# Patient Record
Sex: FEMALE | Race: WHITE | Employment: FULL TIME | URBAN - METROPOLITAN AREA
[De-identification: names, ages, dates, MRNs, and addresses within clinical notes are randomized per-mention and may not be internally consistent; named-entity substitution may affect disease eponyms.]

---

## 2017-03-06 ENCOUNTER — APPOINTMENT (OUTPATIENT)
Dept: GENERAL RADIOLOGY | Age: 37
End: 2017-03-06
Attending: INTERNAL MEDICINE
Payer: COMMERCIAL

## 2017-03-06 ENCOUNTER — HOSPITAL ENCOUNTER (EMERGENCY)
Age: 37
Discharge: HOME | End: 2017-03-06
Attending: INTERNAL MEDICINE
Payer: COMMERCIAL

## 2017-03-06 VITALS
OXYGEN SATURATION: 100 % | HEIGHT: 63 IN | BODY MASS INDEX: 45.89 KG/M2 | SYSTOLIC BLOOD PRESSURE: 149 MMHG | RESPIRATION RATE: 16 BRPM | DIASTOLIC BLOOD PRESSURE: 89 MMHG | WEIGHT: 259 LBS | TEMPERATURE: 97.6 F | HEART RATE: 71 BPM

## 2017-03-06 DIAGNOSIS — M54.10 RADICULOPATHY, UNSPECIFIED SPINAL REGION: Primary | ICD-10-CM

## 2017-03-06 LAB
APPEARANCE UR: CLEAR
BILIRUB UR QL: NEGATIVE
COLOR UR: YELLOW
GLUCOSE UR STRIP.AUTO-MCNC: NEGATIVE MG/DL
HCG UR QL: NEGATIVE
HGB UR QL STRIP: NEGATIVE
KETONES UR QL STRIP.AUTO: NEGATIVE MG/DL
LEUKOCYTE ESTERASE UR QL STRIP.AUTO: NEGATIVE
NITRITE UR QL STRIP.AUTO: NEGATIVE
PH UR STRIP: 7 [PH] (ref 5–8)
PROT UR STRIP-MCNC: NEGATIVE MG/DL
SP GR UR REFRACTOMETRY: 1.02 (ref 1–1.03)
UROBILINOGEN UR QL STRIP.AUTO: 1 EU/DL (ref 0.2–1)

## 2017-03-06 PROCEDURE — 81025 URINE PREGNANCY TEST: CPT

## 2017-03-06 PROCEDURE — 74011250636 HC RX REV CODE- 250/636: Performed by: INTERNAL MEDICINE

## 2017-03-06 PROCEDURE — 99283 EMERGENCY DEPT VISIT LOW MDM: CPT

## 2017-03-06 PROCEDURE — 81003 URINALYSIS AUTO W/O SCOPE: CPT | Performed by: INTERNAL MEDICINE

## 2017-03-06 PROCEDURE — 96372 THER/PROPH/DIAG INJ SC/IM: CPT

## 2017-03-06 PROCEDURE — 72110 X-RAY EXAM L-2 SPINE 4/>VWS: CPT

## 2017-03-06 RX ORDER — BACLOFEN 10 MG/1
5 TABLET ORAL
Qty: 14 TAB | Refills: 0 | Status: SHIPPED | OUTPATIENT
Start: 2017-03-06

## 2017-03-06 RX ORDER — KETOROLAC TROMETHAMINE 30 MG/ML
60 INJECTION, SOLUTION INTRAMUSCULAR; INTRAVENOUS
Status: COMPLETED | OUTPATIENT
Start: 2017-03-06 | End: 2017-03-06

## 2017-03-06 RX ORDER — PREDNISONE 20 MG/1
60 TABLET ORAL DAILY
Qty: 15 TAB | Refills: 0 | Status: SHIPPED | OUTPATIENT
Start: 2017-03-06 | End: 2017-03-11

## 2017-03-06 RX ORDER — LORATADINE 10 MG/1
10 TABLET ORAL DAILY
COMMUNITY

## 2017-03-06 RX ORDER — ERGOCALCIFEROL 1.25 MG/1
50000 CAPSULE ORAL
COMMUNITY

## 2017-03-06 RX ORDER — AMLODIPINE BESYLATE 10 MG/1
10 TABLET ORAL DAILY
COMMUNITY

## 2017-03-06 RX ORDER — IBUPROFEN 800 MG/1
800 TABLET ORAL
Qty: 20 TAB | Refills: 0 | Status: SHIPPED | OUTPATIENT
Start: 2017-03-06 | End: 2017-03-13

## 2017-03-06 RX ORDER — ACETAMINOPHEN AND CODEINE PHOSPHATE 300; 30 MG/1; MG/1
1 TABLET ORAL
Qty: 12 TAB | Refills: 0 | Status: SHIPPED | OUTPATIENT
Start: 2017-03-06 | End: 2021-01-27

## 2017-03-06 RX ORDER — HYDROCHLOROTHIAZIDE 25 MG/1
TABLET ORAL DAILY
COMMUNITY
End: 2021-01-27

## 2017-03-06 RX ADMIN — KETOROLAC TROMETHAMINE 60 MG: 30 INJECTION, SOLUTION INTRAMUSCULAR at 11:34

## 2017-03-06 NOTE — DISCHARGE INSTRUCTIONS
Sciatica: Care Instructions  Your Care Instructions    Sciatica (say \"xif-VW-oq-kuh\") is an irritation of one of the sciatic nerves, which come from the spinal cord in the lower back. The sciatic nerves and their branches extend down through the buttock to the foot. Sciatica can develop when an injured disc in the back presses against a spinal nerve root. Its main symptom is pain, numbness, or weakness that is often worse in the leg or foot than in the back. Sciatica often will improve and go away with time. Early treatment usually includes medicines and exercises to relieve pain. Follow-up care is a key part of your treatment and safety. Be sure to make and go to all appointments, and call your doctor if you are having problems. It's also a good idea to know your test results and keep a list of the medicines you take. How can you care for yourself at home? · Take pain medicines exactly as directed. ¨ If the doctor gave you a prescription medicine for pain, take it as prescribed. ¨ If you are not taking a prescription pain medicine, ask your doctor if you can take an over-the-counter medicine. · Use heat or ice to relieve pain. ¨ To apply heat, put a warm water bottle, heating pad set on low, or warm cloth on your back. Do not go to sleep with a heating pad on your skin. ¨ To use ice, put ice or a cold pack on the area for 10 to 20 minutes at a time. Put a thin cloth between the ice and your skin. · Avoid sitting if possible, unless it feels better than standing. · Alternate lying down with short walks. Increase your walking distance as you are able to without making your symptoms worse. · Do not do anything that makes your symptoms worse. When should you call for help? Call 911 anytime you think you may need emergency care. For example, call if:  · You are unable to move a leg at all.   Call your doctor now or seek immediate medical care if:  · You have new or worse symptoms in your legs or buttocks. Symptoms may include:  ¨ Numbness or tingling. ¨ Weakness. ¨ Pain. · You lose bladder or bowel control. Watch closely for changes in your health, and be sure to contact your doctor if:  · You are not getting better as expected. Where can you learn more? Go to http://adryan-danielle.info/. Enter [phone] in the search box to learn more about \"Sciatica: Care Instructions. \"  Current as of: May 23, 2016  Content Version: 11.1  © 6476-0627 [employer]. Care instructions adapted under license by [employer] (which disclaims liability or warranty for this information). If you have questions about a medical condition or this instruction, always ask your healthcare professional. [address] any warranty or liability for your use of this information. Learning About Relief for Back Pain  What is back tension and strain? Back strain happens when you overstretch, or pull, a muscle in your back. You may hurt your back in an accident or when you exercise or lift something. Most back pain will get better with rest and time. You can take care of yourself at home to help your back heal.  What can you do first to relieve back pain? When you first feel back pain, try these steps:  · Walk. Take a short walk (10 to 20 minutes) on a level surface (no slopes, hills, or stairs) every 2 to 3 hours. Walk only distances you can manage without pain, especially leg pain. · Relax. Find a comfortable position for rest. Some people are comfortable on the floor or a medium-firm bed with a small pillow under their head and another under their knees. Some people prefer to lie on their side with a pillow between their knees. Don't stay in one position for too long. · Try heat or ice. Try using a heating pad on a low or medium setting, or take a warm shower, for 15 to 20 minutes every 2 to 3 hours. Or you can buy single-use heat wraps that last up to 8 hours.  You can also try an ice pack for 10 to 15 minutes every 2 to 3 hours. You can use an ice pack or a bag of frozen vegetables wrapped in a thin towel. There is not strong evidence that either heat or ice will help, but you can try them to see if they help. You may also want to try switching between heat and cold. · Take pain medicine exactly as directed. ¨ If the doctor gave you a prescription medicine for pain, take it as prescribed. ¨ If you are not taking a prescription pain medicine, ask your doctor if you can take an over-the-counter medicine. What else can you do? · Stretch and exercise. Exercises that increase flexibility may relieve your pain and make it easier for your muscles to keep your spine in a good, neutral position. And don't forget to keep walking. · Do self-massage. You can use self-massage to unwind after work or school or to energize yourself in the morning. You can easily massage your feet, hands, or neck. Self-massage works best if you are in comfortable clothes and are sitting or lying in a comfortable position. Use oil or lotion to massage bare skin. · Reduce stress. Back pain can lead to a vicious [place]: Distress about the pain tenses the muscles in your back, which in turn causes more pain. Learn how to relax your mind and your muscles to lower your stress. Where can you learn more? Go to http://adryan-danielle.info/. Enter Z927 in the search box to learn more about \"Learning About Relief for Back Pain. \"  Current as of: May 23, 2016  Content Version: 11.1  © 9228-9056 [employer]. Care instructions adapted under license by [employer] (which disclaims liability or warranty for this information). If you have questions about a medical condition or this instruction, always ask your healthcare professional. [address] any warranty or liability for your use of this information.

## 2017-03-06 NOTE — Clinical Note
No lifting, pulling, pushing for 3 days and then check with your PCP. Avoid sweets to prevent elevating your blood sugar.

## 2017-03-06 NOTE — ED PROVIDER NOTES
[address]  EMERGENCY DEPARTMENT HISTORY AND PHYSICAL EXAM       Date: 3/6/2017   Patient Name: Claude Floras   YOB: 1980  Medical Record Number: [id number]    History of Presenting Illness     Chief Complaint   Patient presents with    Back Pain        History Provided By:  patient    Additional History:   9:39 AM   Claude Floras is a 43 y.o. female presenting to the ED C/O gradually worsening left lower back pain that radiates down to her BLE (calf), rated 8/10, onset yesterday. Pt states the pain started when she was painting her house. Pt reports she had another similar episode ~2 week ago, which resolved on its own. Pt took 800 mg of Ibuprofen and heating pack with mild relief. PMHX HTN (has not taken her medication today). LMP 5 months ago (takes Amlodipine). Pt denies fever, chill, CP, SOB, dysuria, constipation, incontinence, rash, and any other symptoms or complaints. Primary Care Provider: Ld Aguillon MD   Specialist:    Past History     Past Medical History:   Past Medical History:   Diagnosis Date    Asthma     H/O seasonal allergies     Hypertension         Past Surgical History:   Past Surgical History:   Procedure Laterality Date    HX CHOLECYSTECTOMY      HX RHINOPLASTY      sinus     HX SEPTOPLASTY          Family History:   History reviewed. No pertinent family history. Social History:   Social History   Substance Use Topics    Smoking status: Current Every Day Smoker     Packs/day: 0.25    Smokeless tobacco: None    Alcohol use No        Allergies:   No Known Allergies     Review of Systems   Review of Systems   Constitutional: Negative for chills and fever. Respiratory: Negative for shortness of breath. Cardiovascular: Negative for chest pain. Gastrointestinal: Negative for diarrhea, nausea and vomiting. Musculoskeletal: Positive for back pain. Skin: Negative for rash.    All other systems reviewed and are negative. Physical Exam  Vitals:    03/06/17 0919   BP: (!) 162/100   Pulse: 67   Resp: 18   Temp: 97.6 °F (36.4 °C)   SpO2: 100%   Weight: 117.5 kg (259 lb)   Height: 5' 3\" (1.6 m)       Physical Exam   Constitutional: She is oriented to person, place, and time. She appears well-developed and well-nourished. HENT:   Head: Normocephalic and atraumatic. Right Ear: External ear normal.   Left Ear: External ear normal.   Nose: Nose normal.   Mouth/Throat: Oropharynx is clear and moist.   Eyes: Conjunctivae and EOM are normal. Pupils are equal, round, and reactive to light. Right eye exhibits no discharge. Left eye exhibits no discharge. No scleral icterus. Neck: Normal range of motion. Neck supple. No JVD present. No tracheal deviation present. Cardiovascular: Normal rate, regular rhythm, normal heart sounds and intact distal pulses. Pulmonary/Chest: Effort normal and breath sounds normal.   Abdominal: Soft. Bowel sounds are normal. She exhibits no distension. There is no tenderness. There is no CVA tenderness. No HSM   Musculoskeletal: Normal range of motion. Lumbar back: She exhibits tenderness (Left SI joint paraspinal L4-S1 tenderness. ). No spasm. No point tenderness. No skin changes. Neurological: She is alert and oriented to person, place, and time. She has normal reflexes. No focal motor weakness. Skin: Skin is warm and dry. No rash noted. Psychiatric: She has a normal mood and affect. Her behavior is normal.   Nursing note and vitals reviewed.       Diagnostic Study Results     Labs -      Recent Results (from the past 12 hour(s))   URINALYSIS W/ RFLX MICROSCOPIC    Collection Time: 03/06/17 10:53 AM   Result Value Ref Range    Color YELLOW      Appearance CLEAR      Specific gravity 1.019 1.005 - 1.030      pH (UA) 7.0 5.0 - 8.0      Protein NEGATIVE  NEG mg/dL    Glucose NEGATIVE  NEG mg/dL    Ketone NEGATIVE  NEG mg/dL    Bilirubin NEGATIVE  NEG      Blood NEGATIVE  NEG Urobilinogen 1.0 0.2 - 1.0 EU/dL    Nitrites NEGATIVE  NEG      Leukocyte Esterase NEGATIVE  NEG     HCG URINE, QL. - POC    Collection Time: 03/06/17 11:01 AM   Result Value Ref Range    Pregnancy test,urine (POC) NEGATIVE  NEG         Radiologic Studies -    11:39 AM  RADIOLOGY FINDINGS  Lumbar spine X-ray shows NAP. May be early DJD in lower lumbar vertebrae. Pending review by Radiologist  Recorded by Asda Schmidt, ED Scribe, as dictated by Jacklyn De La Torre MD     XR SPINE LUMB MIN 4 V    (Results Pending)        Medical Decision Making   I am the first provider for this patient. I reviewed the vital signs, available nursing notes, past medical history, past surgical history, family history and social history. Vital Signs-Reviewed the patient's vital signs. Patient Vitals for the past 12 hrs:   Temp Pulse Resp BP SpO2   03/06/17 0919 97.6 °F (36.4 °C) 67 18 (!) 162/100 100 %       Pulse Oximetry Analysis - Normal 100% on RA. No intervention needed. Provider Notes:   INITIAL CLINICAL IMPRESSION and PLANS:  The patient presents with the primary complaint(s) of: back pain. The presentation, to include historical aspects and clinical findings are consistent with the DX of sciatica. However, other possible DX's to consider as primary, associated with, or exacerbated by include: degenerative disc disease, herniated disc, mass, abscess, spinal stenosis, lumbar radiculopathy    Considering the above, my initial management plan to evaluate and therapeutic interventions include the following and as noted in the orders:    1. Labs: HCG Urine, UA, POC Pregnancy  2. Imaging: XR Spine Lumb      ED Course:     9:39 AM Initial assessment performed. 11:41 AM Discussed results with pt. On exam, pt has no focal neuro deficits. Normal reflexes plantar and knee. Negative straight leg raise. Instructed her not to lift, pull, push anything over 20 pounds for 3 days and to get further instruction from her PCP.  Pt is thankful for her care. Medications Given in the ED:  Medications   ketorolac tromethamine (TORADOL) 60 mg/2 mL injection 60 mg (60 mg IntraMUSCular Given 3/6/17 1134)          Discharge Note:  11:41 AM  Patients results have been reviewed with them. Patient and/or family have verbally conveyed their understanding and agreement of the patient's signs, symptoms, diagnosis, treatment and prognosis and additionally agree to follow up as recommended or return to the Emergency Room should their condition change prior to their follow-up appointment. Patient verbally agrees with the care-plan and verbally conveys that all of their questions have been answered. Discharge instructions have also been provided to the patient with some educational information regarding their diagnosis as well a list of reasons why they would want to return to the ER prior to their follow-up appointment should their condition change. Diagnosis   Clinical Impression:   1. Radiculopathy, unspecified spinal region        Follow-up Information     Follow up With Details Comments Contact Info    Yoel Leonard MD Schedule an appointment as soon as possible for a visit in 2 days  [address] [id number] [phone]      [facility] EMERGENCY DEPT  As needed, If symptoms worsen [address] Dariel Gamez  [phone]          Current Discharge Medication List      START taking these medications    Details   ibuprofen (MOTRIN) 800 mg tablet Take 1 Tab by mouth every six (6) hours as needed for Pain for up to 7 days. Qty: 20 Tab, Refills: 0      baclofen (LIORESAL) 10 mg tablet Take 0.5 Tabs by mouth two (2) times daily as needed. Qty: 14 Tab, Refills: 0      acetaminophen-codeine (TYLENOL-CODEINE #3) 300-30 mg per tablet Take 1 Tab by mouth every six (6) hours as needed for Pain. Max Daily Amount: 4 Tabs.   Qty: 12 Tab, Refills: 0         CONTINUE these medications which have NOT CHANGED Details   AMLODIPINE BESYLATE (AMLODIPINE PO) Take  by mouth daily. METOPROLOL SUCCINATE PO Take  by mouth two (2) times a day. hydroCHLOROthiazide (HYDRODIURIL) 25 mg tablet Take  by mouth daily. LOSARTAN PO Take  by mouth daily. MONTELUKAST SODIUM (SINGULAIR PO) Take  by mouth daily. loratadine (CLARITIN) 10 mg tablet Take 10 mg by mouth daily. ergocalciferol (VITAMIN D2) 50,000 unit capsule Take 50,000 Units by mouth every seven (7) days. _______________________________   Attestations:     SCRIBE ATTESTATION:  This note is prepared by Fuentes Seay, acting as Scribe for Mely Mendoza MD.    PROVIDER ATTESTATION:  Mely Mendoza MD: The scribe's documentation has been prepared under my direction and personally reviewed by me in its entirety.  I confirm that the note above accurately reflects all work, treatment, procedures, and medical decision making performed by me.   _______________________________

## 2017-03-06 NOTE — LETTER
[facility] [place] 
[facility] 
[address] [id number] 
[phone] Work/School Note Date: 3/6/2017 To Whom It May concern: 
 
Annmarie Little was seen and treated today in the emergency room by the following provider(s): 
No providers found. Annmarie Little may return to work on 3/7/17. Sincerely, Dhara Hensley MD

## 2017-03-06 NOTE — LETTER
NOTIFICATION RETURN TO WORK / SCHOOL 
 
3/6/2017 4:21 PM 
 
[facility] [address] [place] [address] To Whom It May Concern: 
 
Lydia Perea is currently under the care of [facility] EMERGENCY DEPT. She will return to work/school on: 3/9/2017 If there are questions or concerns please have the patient contact our office. Sincerely, Philippe Toledo RN

## 2017-03-06 NOTE — ED TRIAGE NOTES
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

## 2017-03-06 NOTE — LETTER
[facility] [place] 
[facility] 
[address] [address] 
[phone] Work/School Note Date: 3/6/2017 To Whom It May concern: 
 
Brandy Felix was seen and treated today in the emergency room by the following provider(s): 
Attending Provider: Velia Renteria MD. Brandy Felix may return to work on 3/6/17. Please allow her to avoid lifting, pulling, pushing for 3 days. Further instruction to be given by PCP. Sincerely, Velia Renteria MD

## 2017-03-06 NOTE — ED NOTES
I have reviewed discharge instructions with the patient. The patient verbalized understanding.   Patient armband removed and shredded, scripts x 3 sent to pharmacy and verified with pt, script x 1 given

## 2018-03-24 ENCOUNTER — APPOINTMENT (OUTPATIENT)
Dept: GENERAL RADIOLOGY | Age: 38
End: 2018-03-24
Attending: PHYSICIAN ASSISTANT
Payer: COMMERCIAL

## 2018-03-24 ENCOUNTER — HOSPITAL ENCOUNTER (EMERGENCY)
Age: 38
Discharge: HOME | End: 2018-03-24
Attending: INTERNAL MEDICINE
Payer: COMMERCIAL

## 2018-03-24 VITALS
HEIGHT: 63 IN | WEIGHT: 259 LBS | RESPIRATION RATE: 28 BRPM | TEMPERATURE: 98 F | DIASTOLIC BLOOD PRESSURE: 72 MMHG | BODY MASS INDEX: 45.89 KG/M2 | OXYGEN SATURATION: 99 % | SYSTOLIC BLOOD PRESSURE: 106 MMHG | HEART RATE: 81 BPM

## 2018-03-24 DIAGNOSIS — R07.9 CHEST PAIN, UNSPECIFIED TYPE: Primary | ICD-10-CM

## 2018-03-24 LAB
ALBUMIN SERPL-MCNC: 3.8 G/DL (ref 3.4–5)
ALBUMIN/GLOB SERPL: 0.9 {RATIO} (ref 0.8–1.7)
ALP SERPL-CCNC: 122 U/L (ref 45–117)
ALT SERPL-CCNC: 64 U/L (ref 13–56)
ANION GAP SERPL CALC-SCNC: 11 MMOL/L (ref 3–18)
APPEARANCE UR: CLEAR
AST SERPL-CCNC: 24 U/L (ref 15–37)
BASOPHILS # BLD: 0 K/UL (ref 0–0.06)
BASOPHILS NFR BLD: 0 % (ref 0–2)
BILIRUB SERPL-MCNC: 0.4 MG/DL (ref 0.2–1)
BILIRUB UR QL: NEGATIVE
BNP SERPL-MCNC: 18 PG/ML (ref 0–450)
BUN SERPL-MCNC: 25 MG/DL (ref 7–18)
BUN/CREAT SERPL: 22 (ref 12–20)
CALCIUM SERPL-MCNC: 9 MG/DL (ref 8.5–10.1)
CHLORIDE SERPL-SCNC: 101 MMOL/L (ref 100–108)
CK MB CFR SERPL CALC: NORMAL % (ref 0–4)
CK MB SERPL-MCNC: <1 NG/ML (ref 5–25)
CK SERPL-CCNC: 70 U/L (ref 26–192)
CK SERPL-CCNC: 73 U/L (ref 26–192)
CK SERPL-CCNC: 88 U/L (ref 26–192)
CO2 SERPL-SCNC: 31 MMOL/L (ref 21–32)
COLOR UR: YELLOW
CREAT SERPL-MCNC: 1.15 MG/DL (ref 0.6–1.3)
D DIMER PPP FEU-MCNC: <0.27 UG/ML(FEU)
DIFFERENTIAL METHOD BLD: ABNORMAL
EOSINOPHIL # BLD: 0.1 K/UL (ref 0–0.4)
EOSINOPHIL NFR BLD: 1 % (ref 0–5)
ERYTHROCYTE [DISTWIDTH] IN BLOOD BY AUTOMATED COUNT: 13.6 % (ref 11.6–14.5)
GLOBULIN SER CALC-MCNC: 4.2 G/DL (ref 2–4)
GLUCOSE SERPL-MCNC: 152 MG/DL (ref 74–99)
GLUCOSE UR STRIP.AUTO-MCNC: NEGATIVE MG/DL
HCG UR QL: NEGATIVE
HCT VFR BLD AUTO: 42.7 % (ref 35–45)
HGB BLD-MCNC: 14.6 G/DL (ref 12–16)
HGB UR QL STRIP: NEGATIVE
KETONES UR QL STRIP.AUTO: NEGATIVE MG/DL
LEUKOCYTE ESTERASE UR QL STRIP.AUTO: NEGATIVE
LIPASE SERPL-CCNC: 201 U/L (ref 73–393)
LYMPHOCYTES # BLD: 1.4 K/UL (ref 0.9–3.6)
LYMPHOCYTES NFR BLD: 10 % (ref 21–52)
MCH RBC QN AUTO: 27.1 PG (ref 24–34)
MCHC RBC AUTO-ENTMCNC: 34.2 G/DL (ref 31–37)
MCV RBC AUTO: 79.4 FL (ref 74–97)
MONOCYTES # BLD: 0.6 K/UL (ref 0.05–1.2)
MONOCYTES NFR BLD: 4 % (ref 3–10)
NEUTS SEG # BLD: 12 K/UL (ref 1.8–8)
NEUTS SEG NFR BLD: 85 % (ref 40–73)
NITRITE UR QL STRIP.AUTO: NEGATIVE
PH UR STRIP: 5.5 [PH] (ref 5–8)
PLATELET # BLD AUTO: 301 K/UL (ref 135–420)
PMV BLD AUTO: 9.2 FL (ref 9.2–11.8)
POTASSIUM SERPL-SCNC: 3.2 MMOL/L (ref 3.5–5.5)
PROT SERPL-MCNC: 8 G/DL (ref 6.4–8.2)
PROT UR STRIP-MCNC: NEGATIVE MG/DL
RBC # BLD AUTO: 5.38 M/UL (ref 4.2–5.3)
SODIUM SERPL-SCNC: 143 MMOL/L (ref 136–145)
SP GR UR REFRACTOMETRY: 1.02 (ref 1–1.03)
TROPONIN I SERPL-MCNC: <0.02 NG/ML (ref 0–0.06)
UROBILINOGEN UR QL STRIP.AUTO: 0.2 EU/DL (ref 0.2–1)
WBC # BLD AUTO: 14.1 K/UL (ref 4.6–13.2)

## 2018-03-24 PROCEDURE — 96374 THER/PROPH/DIAG INJ IV PUSH: CPT

## 2018-03-24 PROCEDURE — 71045 X-RAY EXAM CHEST 1 VIEW: CPT

## 2018-03-24 PROCEDURE — 74011250636 HC RX REV CODE- 250/636: Performed by: PHYSICIAN ASSISTANT

## 2018-03-24 PROCEDURE — 99285 EMERGENCY DEPT VISIT HI MDM: CPT

## 2018-03-24 PROCEDURE — 77030013140 HC MSK NEB VYRM -A

## 2018-03-24 PROCEDURE — 81003 URINALYSIS AUTO W/O SCOPE: CPT | Performed by: PHYSICIAN ASSISTANT

## 2018-03-24 PROCEDURE — 74011000250 HC RX REV CODE- 250: Performed by: PHYSICIAN ASSISTANT

## 2018-03-24 PROCEDURE — 96375 TX/PRO/DX INJ NEW DRUG ADDON: CPT

## 2018-03-24 PROCEDURE — 83880 ASSAY OF NATRIURETIC PEPTIDE: CPT | Performed by: PHYSICIAN ASSISTANT

## 2018-03-24 PROCEDURE — 85025 COMPLETE CBC W/AUTO DIFF WBC: CPT | Performed by: PHYSICIAN ASSISTANT

## 2018-03-24 PROCEDURE — 81025 URINE PREGNANCY TEST: CPT | Performed by: PHYSICIAN ASSISTANT

## 2018-03-24 PROCEDURE — 83690 ASSAY OF LIPASE: CPT | Performed by: PHYSICIAN ASSISTANT

## 2018-03-24 PROCEDURE — 82550 ASSAY OF CK (CPK): CPT | Performed by: INTERNAL MEDICINE

## 2018-03-24 PROCEDURE — 93005 ELECTROCARDIOGRAM TRACING: CPT

## 2018-03-24 PROCEDURE — 94640 AIRWAY INHALATION TREATMENT: CPT

## 2018-03-24 PROCEDURE — 96361 HYDRATE IV INFUSION ADD-ON: CPT

## 2018-03-24 PROCEDURE — 80053 COMPREHEN METABOLIC PANEL: CPT | Performed by: PHYSICIAN ASSISTANT

## 2018-03-24 PROCEDURE — 85379 FIBRIN DEGRADATION QUANT: CPT | Performed by: PHYSICIAN ASSISTANT

## 2018-03-24 RX ORDER — FAMOTIDINE 10 MG/ML
20 INJECTION INTRAVENOUS
Status: COMPLETED | OUTPATIENT
Start: 2018-03-24 | End: 2018-03-24

## 2018-03-24 RX ORDER — IPRATROPIUM BROMIDE AND ALBUTEROL SULFATE 2.5; .5 MG/3ML; MG/3ML
3 SOLUTION RESPIRATORY (INHALATION)
Status: COMPLETED | OUTPATIENT
Start: 2018-03-24 | End: 2018-03-24

## 2018-03-24 RX ORDER — KETOROLAC TROMETHAMINE 30 MG/ML
30 INJECTION, SOLUTION INTRAMUSCULAR; INTRAVENOUS
Status: COMPLETED | OUTPATIENT
Start: 2018-03-24 | End: 2018-03-24

## 2018-03-24 RX ADMIN — IPRATROPIUM BROMIDE AND ALBUTEROL SULFATE 3 ML: .5; 3 SOLUTION RESPIRATORY (INHALATION) at 17:55

## 2018-03-24 RX ADMIN — SODIUM CHLORIDE 1000 ML: 900 INJECTION, SOLUTION INTRAVENOUS at 21:00

## 2018-03-24 RX ADMIN — KETOROLAC TROMETHAMINE 30 MG: 30 INJECTION, SOLUTION INTRAMUSCULAR at 17:55

## 2018-03-24 RX ADMIN — FAMOTIDINE 20 MG: 10 INJECTION INTRAVENOUS at 18:10

## 2018-03-24 NOTE — ED TRIAGE NOTES
Triage: chest pain x 2 hours, left arm pain. Sepsis Screening completed    (  )Patient meets SIRS criteria. ( x )Patient does not meet SIRS criteria.       SIRS Criteria is achieved when two or more of the following are present   Temperature < 96.8°F (36°C) or > 100.9°F (38.3°C)   Heart Rate > 90 beats per minute   Respiratory Rate > 20 breaths per minute   WBC count > 12,000 or <4,000 or > 10% bands

## 2018-03-24 NOTE — ED PROVIDER NOTES
EMERGENCY DEPARTMENT HISTORY AND PHYSICAL EXAM    Date: 3/24/2018  Patient Name: Sheila Paredes    History of Presenting Illness     Chief Complaint   Patient presents with    Chest Pain         History Provided By: Patient    Chief Complaint: CP  Duration: 2 Hours  Timing:  Acute  Location: Diffuse over chest  Associated Symptoms: SOB    Additional History (Context):   5:30 PM  Sheila Paredes is a 42 y.o. female with PMHX of HTN urgency, asthma who presents to the emergency department C/O CP that radiates into her back, onset 2 hours ago while driving. Associated sxs include SOB. Pt takes Amlodipine, Hydrodiuril, Lioresal daily. Pt uses Mirena as birth control. Pt reports that she underwent a cholecystectomy many years ago. Pt socially endorses tobacco (.5 pack per week). Pt denies HX of MI, acid reflux, recent surgeries, and any other sxs or complaints. PCP: Bianca Gordon MD    Current Outpatient Prescriptions   Medication Sig Dispense Refill    AMLODIPINE BESYLATE (AMLODIPINE PO) Take  by mouth daily.  METOPROLOL SUCCINATE PO Take  by mouth two (2) times a day.  hydroCHLOROthiazide (HYDRODIURIL) 25 mg tablet Take  by mouth daily.  LOSARTAN PO Take  by mouth daily.  loratadine (CLARITIN) 10 mg tablet Take 10 mg by mouth daily.  ergocalciferol (VITAMIN D2) 50,000 unit capsule Take 50,000 Units by mouth every seven (7) days.  MONTELUKAST SODIUM (SINGULAIR PO) Take  by mouth daily.  baclofen (LIORESAL) 10 mg tablet Take 0.5 Tabs by mouth two (2) times daily as needed. 14 Tab 0    acetaminophen-codeine (TYLENOL-CODEINE #3) 300-30 mg per tablet Take 1 Tab by mouth every six (6) hours as needed for Pain. Max Daily Amount: 4 Tabs.  12 Tab 0       Past History     Past Medical History:  Past Medical History:   Diagnosis Date    Asthma     H/O seasonal allergies     Hypertension        Past Surgical History:  Past Surgical History:   Procedure Laterality Date    HX CHOLECYSTECTOMY      HX RHINOPLASTY      sinus     HX SEPTOPLASTY         Family History:  History reviewed. No pertinent family history. Social History:  Social History   Substance Use Topics    Smoking status: Current Every Day Smoker     Packs/day: 0.25    Smokeless tobacco: None    Alcohol use No       Allergies:  No Known Allergies      Review of Systems   Review of Systems   Constitutional: Negative for chills and fever. Respiratory: Positive for shortness of breath. Negative for cough. Cardiovascular: Positive for chest pain. Negative for palpitations and leg swelling. Gastrointestinal: Negative for abdominal pain, nausea and vomiting. Genitourinary: Negative for dysuria. Musculoskeletal: Negative for back pain. Skin: Negative for rash. Neurological: Negative for dizziness, weakness, light-headedness and headaches. Hematological: Negative for adenopathy. All other systems reviewed and are negative. Physical Exam     Vitals:    03/24/18 2000 03/24/18 2030 03/24/18 2130 03/24/18 2230   BP: 93/55 108/76 96/60 106/72   Pulse: 78 79 80 81   Resp: 19 24 25 28   Temp:       SpO2: 97% 95% 97% 99%   Weight:       Height:         Physical Exam   Constitutional: She is oriented to person, place, and time. She appears well-developed and well-nourished. No distress.  obese female in NAD. Alert. Appears uncomfortable at times. HENT:   Head: Normocephalic and atraumatic. Left Ear: External ear normal.   Nose: Nose normal.   Eyes: Conjunctivae are normal. No scleral icterus. Neck: Normal range of motion. Cardiovascular: Normal rate, regular rhythm, normal heart sounds and intact distal pulses. Exam reveals no gallop and no friction rub. No murmur heard. Pulmonary/Chest: Effort normal and breath sounds normal. No accessory muscle usage. No tachypnea. No respiratory distress. She has no decreased breath sounds. She has no wheezes. She has no rhonchi. She has no rales. She exhibits tenderness. Abdominal: Soft. Normal appearance and bowel sounds are normal. She exhibits no ascites and no mass. There is no rigidity, no guarding, no CVA tenderness and no tenderness at McBurney's point. Musculoskeletal: Normal range of motion. She exhibits no edema. Neurological: She is alert and oriented to person, place, and time. Skin: Skin is warm and dry. No rash noted. She is not diaphoretic. No erythema. Psychiatric: She has a normal mood and affect. Judgment normal.   Nursing note and vitals reviewed. Diagnostic Study Results     Labs -     No results found for this or any previous visit (from the past 12 hour(s)). Radiologic Studies -   XR CHEST PORT   Final Result        CT Results  (Last 48 hours)    None        CXR Results  (Last 48 hours)               03/24/18 1808  XR CHEST PORT Final result    Impression:  IMPRESSION:       No acute cardiopulmonary process. Narrative:  EXAM: Portable chest x-ray       INDICATION: Chest pain. COMPARISON: No prior relevant study available for comparison. TECHNIQUE: Single AP view of the chest was obtained.       _______________       FINDINGS:       The lungs are clear of focal infiltrate. There is no pneumothorax or pleural   effusion. Heart size is within normal limits. There is no significant vascular   congestion. There is no acute osseous abnormality.         _______________               7:06 PM  RADIOLOGY FINDINGS  Chest X-ray shows NAP  Pending review by Radiologist  Recorded by Humphrey Lilly ED Scribe, as dictated by Wesly Truong PA-C    Medications given in the ED-  Medications   albuterol-ipratropium (DUO-NEB) 2.5 MG-0.5 MG/3 ML (3 mL Nebulization Given 3/24/18 1755)   ketorolac (TORADOL) injection 30 mg (30 mg IntraVENous Given 3/24/18 1755)   famotidine (PF) (PEPCID) injection 20 mg (20 mg IntraVENous Given 3/24/18 1810)   sodium chloride 0.9 % bolus infusion 1,000 mL (0 mL IntraVENous IV Completed 3/24/18 2200)         Medical Decision Making   I am the first provider for this patient. I reviewed the vital signs, available nursing notes, past medical history, past surgical history, family history and social history. Vital Signs-Reviewed the patient's vital signs. Pulse Oximetry Analysis - 100% on RA     Cardiac Monitor:  Rate: 71 bpm  Rhythm: NSR    EKG interpretation: (Preliminary)  4:25 PM   NSR. 71 bpm. No STEMI. EKG read by Sampson Rubio MD at 4:25 PM     EKG interpretation: (Secondary)  7:42 PM   78 bpm. NSR. Early repolarization. EKG read by Toro Marques PA-C at 7:42 PM    EKG interpretation: (Tertiary)  82 bpm. NSR. Unremarkable T wave. EKG read by Christelle. Alexandre Walker MD at 9:43 PM      Records Reviewed: Nursing Notes    Provider Notes (Medical Decision Making): ACS/MI, PE, arrhythmia, pericarditis, myocarditis, PNA, COPD, CHF, PTX, GERD, chest wall pain. Doubt dissection. Procedures:  Procedures    ED Course:   5:30 PM   Initial assessment performed. The patients presenting problems have been discussed, and they are in agreement with the care plan formulated and outlined with them. I have encouraged them to ask questions as they arise throughout their visit. 7:16 PM  Feels much better, but still has some sxs including chest discomfort, so will recheck enzymes and EKG and likely discharge home. CONSULT NOTE:   7:50 PM  Toro Marques PA-C spoke with Christelle. Alexandre Walker MD   Specialty: ED Medicine  Discussed pt's hx, disposition, and available diagnostic and imaging results  in person. Reviewed care plans. Consulting physician agrees with plans as outlined. Reviewed EKGs and case with Dr. Alexandre Walker. Feels that EKG is likely early repolarization. Recommends rechecking 3 hr Troponin around 2100 and obtaining a third EKG. Pt is CP free, playing on her phone, appears very comfortable, and understands plan. Low cardiac risk.      SIGN OUT:  9:00 PM  Patient's presentation, labs/imaging and plan of care was reviewed with Nadeem Broderick PA-C as part of sign out. They will review serial EKG and Troponin and likely discharge as part of the plan discussed with the patient. Nadeem Broderick PA-C's assistance in completion of this plan is greatly appreciated but it should be noted that I will be the provider of record for this patient. 10:22 PM  Cardiac enzymes negative, EKG normal. Discussed case with Dr Rossy Veliz who agrees with d/c home with cardiology f/u      Diagnosis and Disposition       DISCHARGE NOTE:  10:17 PM  Lane Fernandez  results have been reviewed with her. She has been counseled regarding her diagnosis, treatment, and plan. She verbally conveys understanding and agreement of the signs, symptoms, diagnosis, treatment and prognosis and additionally agrees to follow up as discussed. She also agrees with the care-plan and conveys that all of her questions have been answered. I have also provided discharge instructions for her that include: educational information regarding their diagnosis and treatment, and list of reasons why they would want to return to the ED prior to their follow-up appointment, should her condition change. She has been provided with education for proper emergency department utilization. CLINICAL IMPRESSION:    1. Chest pain, unspecified type        PLAN:  1. D/C Home  2. Discharge Medication List as of 3/24/2018 10:16 PM        3. Follow-up Information     Follow up With Details Comments Contact Info    Jahaira Mayorga MD Schedule an appointment as soon as possible for a visit For primary care follow up [address] [place] [address] EMERGENCY DEPT Go to As needed, as symptoms worsen [address] [place] [address]  [phone]        _______________________________    Attestations: This note is prepared by Salome Padilla, acting as Scribe for Michael Hurst PA-C.     Lala Turner TEMITOPE Herron:  The scribe's documentation has been prepared under my direction and personally reviewed by me in its entirety. I confirm that the note above accurately reflects all work, treatment, procedures, and medical decision making performed by me. This note is prepared by Nikolas Rodriguez, acting as Scribe for Nadeem Broderick PA-C. Nadeem Broderick PA-C: The scribe's documentation has been prepared under my direction and personally reviewed by me in its entirety.  I confirm that the note above accurately reflects all work, treatment, procedures, and medical decision making performed by me.    _______________________________

## 2018-03-25 LAB
ATRIAL RATE: 71 BPM
ATRIAL RATE: 78 BPM
ATRIAL RATE: 82 BPM
CALCULATED P AXIS, ECG09: 26 DEGREES
CALCULATED P AXIS, ECG09: 27 DEGREES
CALCULATED P AXIS, ECG09: 31 DEGREES
CALCULATED R AXIS, ECG10: 28 DEGREES
CALCULATED R AXIS, ECG10: 32 DEGREES
CALCULATED R AXIS, ECG10: 37 DEGREES
CALCULATED T AXIS, ECG11: 22 DEGREES
CALCULATED T AXIS, ECG11: 23 DEGREES
CALCULATED T AXIS, ECG11: 29 DEGREES
DIAGNOSIS, 93000: NORMAL
P-R INTERVAL, ECG05: 174 MS
P-R INTERVAL, ECG05: 174 MS
P-R INTERVAL, ECG05: 178 MS
Q-T INTERVAL, ECG07: 380 MS
Q-T INTERVAL, ECG07: 380 MS
Q-T INTERVAL, ECG07: 388 MS
QRS DURATION, ECG06: 88 MS
QRS DURATION, ECG06: 88 MS
QRS DURATION, ECG06: 98 MS
QTC CALCULATION (BEZET), ECG08: 421 MS
QTC CALCULATION (BEZET), ECG08: 433 MS
QTC CALCULATION (BEZET), ECG08: 443 MS
VENTRICULAR RATE, ECG03: 71 BPM
VENTRICULAR RATE, ECG03: 78 BPM
VENTRICULAR RATE, ECG03: 82 BPM

## 2018-03-25 NOTE — DISCHARGE INSTRUCTIONS
Chest Pain: Care Instructions  Your Care Instructions    There are many things that can cause chest pain. Some are not serious and will get better on their own in a few days. But some kinds of chest pain need more testing and treatment. Your doctor may have recommended a follow-up visit in the next 8 to 12 hours. If you are not getting better, you may need more tests or treatment. Even though your doctor has released you, you still need to watch for any problems. The doctor carefully checked you, but sometimes problems can develop later. If you have new symptoms or if your symptoms do not get better, get medical care right away. If you have worse or different chest pain or pressure that lasts more than 5 minutes or you passed out (lost consciousness), call 911 or seek other emergency help right away. A medical visit is only one step in your treatment. Even if you feel better, you still need to do what your doctor recommends, such as going to all suggested follow-up appointments and taking medicines exactly as directed. This will help you recover and help prevent future problems. How can you care for yourself at home? · Rest until you feel better. · Take your medicine exactly as prescribed. Call your doctor if you think you are having a problem with your medicine. · Do not drive after taking a prescription pain medicine. When should you call for help? Call 911 if:  ? · You passed out (lost consciousness). ? · You have severe difficulty breathing. ? · You have symptoms of a heart attack. These may include:  ¨ Chest pain or pressure, or a strange feeling in your chest.  ¨ Sweating. ¨ Shortness of breath. ¨ Nausea or vomiting. ¨ Pain, pressure, or a strange feeling in your back, neck, jaw, or upper belly or in one or both shoulders or arms. ¨ Lightheadedness or sudden weakness. ¨ A fast or irregular heartbeat.   After you call 911, the  may tell you to chew 1 adult-strength or 2 to 4 low-dose aspirin. Wait for an ambulance. Do not try to drive yourself. ?Call your doctor today if:  ? · You have any trouble breathing. ? · Your chest pain gets worse. ? · You are dizzy or lightheaded, or you feel like you may faint. ? · You are not getting better as expected. ? · You are having new or different chest pain. Where can you learn more? Go to http://adryan-danielle.info/. Enter A120 in the search box to learn more about \"Chest Pain: Care Instructions. \"  Current as of: March 20, 2017  Content Version: 11.4  © 3576-2042 [employer]. Care instructions adapted under license by [employer] (which disclaims liability or warranty for this information). If you have questions about a medical condition or this instruction, always ask your healthcare professional. [address] any warranty or liability for your use of this information.

## 2021-01-22 ENCOUNTER — TRANSCRIBE ORDER (OUTPATIENT)
Dept: REGISTRATION | Age: 41
End: 2021-01-22

## 2021-01-22 ENCOUNTER — HOSPITAL ENCOUNTER (OUTPATIENT)
Dept: PREADMISSION TESTING | Age: 41
Discharge: HOME | End: 2021-01-22
Payer: COMMERCIAL

## 2021-01-22 DIAGNOSIS — D41.02 NEPHROBLASTOMATOSIS OF LEFT KIDNEY: Primary | ICD-10-CM

## 2021-01-22 DIAGNOSIS — D41.02 NEPHROBLASTOMATOSIS OF LEFT KIDNEY: ICD-10-CM

## 2021-01-22 LAB
ANION GAP SERPL CALC-SCNC: 5 MMOL/L (ref 3–18)
ATRIAL RATE: 58 BPM
BASOPHILS # BLD: 0.1 K/UL (ref 0–0.1)
BASOPHILS NFR BLD: 1 % (ref 0–2)
BUN SERPL-MCNC: 23 MG/DL (ref 7–18)
BUN/CREAT SERPL: 24 (ref 12–20)
CALCIUM SERPL-MCNC: 9.3 MG/DL (ref 8.5–10.1)
CALCULATED P AXIS, ECG09: 57 DEGREES
CALCULATED R AXIS, ECG10: 69 DEGREES
CALCULATED T AXIS, ECG11: 54 DEGREES
CHLORIDE SERPL-SCNC: 102 MMOL/L (ref 100–111)
CO2 SERPL-SCNC: 31 MMOL/L (ref 21–32)
CREAT SERPL-MCNC: 0.96 MG/DL (ref 0.6–1.3)
DIAGNOSIS, 93000: NORMAL
DIFFERENTIAL METHOD BLD: ABNORMAL
EOSINOPHIL # BLD: 0.4 K/UL (ref 0–0.4)
EOSINOPHIL NFR BLD: 3 % (ref 0–5)
ERYTHROCYTE [DISTWIDTH] IN BLOOD BY AUTOMATED COUNT: 13.3 % (ref 11.6–14.5)
EST. AVERAGE GLUCOSE BLD GHB EST-MCNC: 151 MG/DL
GLUCOSE SERPL-MCNC: 139 MG/DL (ref 74–99)
HBA1C MFR BLD: 6.9 % (ref 4.2–5.6)
HCT VFR BLD AUTO: 43.8 % (ref 35–45)
HGB BLD-MCNC: 14.9 G/DL (ref 12–16)
LYMPHOCYTES # BLD: 3.2 K/UL (ref 0.9–3.6)
LYMPHOCYTES NFR BLD: 26 % (ref 21–52)
MCH RBC QN AUTO: 28 PG (ref 24–34)
MCHC RBC AUTO-ENTMCNC: 34 G/DL (ref 31–37)
MCV RBC AUTO: 82.2 FL (ref 74–97)
MONOCYTES # BLD: 0.8 K/UL (ref 0.05–1.2)
MONOCYTES NFR BLD: 6 % (ref 3–10)
NEUTS SEG # BLD: 7.9 K/UL (ref 1.8–8)
NEUTS SEG NFR BLD: 64 % (ref 40–73)
P-R INTERVAL, ECG05: 170 MS
PLATELET # BLD AUTO: 402 K/UL (ref 135–420)
PMV BLD AUTO: 9.5 FL (ref 9.2–11.8)
POTASSIUM SERPL-SCNC: 3.9 MMOL/L (ref 3.5–5.5)
Q-T INTERVAL, ECG07: 446 MS
QRS DURATION, ECG06: 98 MS
QTC CALCULATION (BEZET), ECG08: 437 MS
RBC # BLD AUTO: 5.33 M/UL (ref 4.2–5.3)
SODIUM SERPL-SCNC: 138 MMOL/L (ref 136–145)
VENTRICULAR RATE, ECG03: 58 BPM
WBC # BLD AUTO: 12.3 K/UL (ref 4.6–13.2)

## 2021-01-22 PROCEDURE — 80048 BASIC METABOLIC PNL TOTAL CA: CPT

## 2021-01-22 PROCEDURE — 83036 HEMOGLOBIN GLYCOSYLATED A1C: CPT

## 2021-01-22 PROCEDURE — 85025 COMPLETE CBC W/AUTO DIFF WBC: CPT

## 2021-01-22 PROCEDURE — 93005 ELECTROCARDIOGRAM TRACING: CPT

## 2021-01-22 PROCEDURE — 36415 COLL VENOUS BLD VENIPUNCTURE: CPT

## 2021-01-28 ENCOUNTER — HOSPITAL ENCOUNTER (OUTPATIENT)
Dept: PREADMISSION TESTING | Age: 41
Discharge: HOME | End: 2021-01-28
Payer: COMMERCIAL

## 2021-01-28 PROCEDURE — U0003 INFECTIOUS AGENT DETECTION BY NUCLEIC ACID (DNA OR RNA); SEVERE ACUTE RESPIRATORY SYNDROME CORONAVIRUS 2 (SARS-COV-2) (CORONAVIRUS DISEASE [COVID-19]), AMPLIFIED PROBE TECHNIQUE, MAKING USE OF HIGH THROUGHPUT TECHNOLOGIES AS DESCRIBED BY CMS-2020-01-R: HCPCS

## 2021-01-29 LAB — SARS-COV-2, COV2NT: NOT DETECTED

## 2021-02-02 ENCOUNTER — ANESTHESIA EVENT (OUTPATIENT)
Dept: SURGERY | Age: 41
DRG: 657 | End: 2021-02-02
Payer: COMMERCIAL

## 2021-02-03 ENCOUNTER — HOSPITAL ENCOUNTER (INPATIENT)
Age: 41
LOS: 2 days | Discharge: HOME | DRG: 657 | End: 2021-02-05
Attending: UROLOGY | Admitting: UROLOGY
Payer: COMMERCIAL

## 2021-02-03 ENCOUNTER — ANESTHESIA (OUTPATIENT)
Dept: SURGERY | Age: 41
DRG: 657 | End: 2021-02-03
Payer: COMMERCIAL

## 2021-02-03 DIAGNOSIS — N28.89 LEFT RENAL MASS: Primary | ICD-10-CM

## 2021-02-03 LAB
ABO + RH BLD: NORMAL
ANION GAP SERPL CALC-SCNC: 6 MMOL/L (ref 3–18)
BLOOD GROUP ANTIBODIES SERPL: NORMAL
BUN SERPL-MCNC: 34 MG/DL (ref 7–18)
BUN/CREAT SERPL: 26 (ref 12–20)
CALCIUM SERPL-MCNC: 8.8 MG/DL (ref 8.5–10.1)
CHLORIDE SERPL-SCNC: 105 MMOL/L (ref 100–111)
CO2 SERPL-SCNC: 27 MMOL/L (ref 21–32)
CREAT SERPL-MCNC: 1.33 MG/DL (ref 0.6–1.3)
ERYTHROCYTE [DISTWIDTH] IN BLOOD BY AUTOMATED COUNT: 13.2 % (ref 11.6–14.5)
EST. AVERAGE GLUCOSE BLD GHB EST-MCNC: 143 MG/DL
GLUCOSE BLD STRIP.AUTO-MCNC: 131 MG/DL (ref 70–110)
GLUCOSE BLD STRIP.AUTO-MCNC: 141 MG/DL (ref 70–110)
GLUCOSE BLD STRIP.AUTO-MCNC: 181 MG/DL (ref 70–110)
GLUCOSE BLD STRIP.AUTO-MCNC: 195 MG/DL (ref 70–110)
GLUCOSE SERPL-MCNC: 183 MG/DL (ref 74–99)
HBA1C MFR BLD: 6.6 % (ref 4.2–5.6)
HCG UR QL: NEGATIVE
HCT VFR BLD AUTO: 41.4 % (ref 35–45)
HGB BLD-MCNC: 14.2 G/DL (ref 12–16)
MCH RBC QN AUTO: 29.3 PG (ref 24–34)
MCHC RBC AUTO-ENTMCNC: 34.3 G/DL (ref 31–37)
MCV RBC AUTO: 85.4 FL (ref 74–97)
PLATELET # BLD AUTO: 320 K/UL (ref 135–420)
PMV BLD AUTO: 9.4 FL (ref 9.2–11.8)
POTASSIUM SERPL-SCNC: 4.5 MMOL/L (ref 3.5–5.5)
RBC # BLD AUTO: 4.85 M/UL (ref 4.2–5.3)
SODIUM SERPL-SCNC: 138 MMOL/L (ref 136–145)
SPECIMEN EXP DATE BLD: NORMAL
WBC # BLD AUTO: 19.4 K/UL (ref 4.6–13.2)

## 2021-02-03 PROCEDURE — 77030010935 HC CLP LIG ABSRB TELE -B: Performed by: UROLOGY

## 2021-02-03 PROCEDURE — 77030016151 HC PROTCTR LNS DFOG COVD -B: Performed by: UROLOGY

## 2021-02-03 PROCEDURE — 77010033678 HC OXYGEN DAILY

## 2021-02-03 PROCEDURE — 86901 BLOOD TYPING SEROLOGIC RH(D): CPT

## 2021-02-03 PROCEDURE — 74011250636 HC RX REV CODE- 250/636: Performed by: NURSE ANESTHETIST, CERTIFIED REGISTERED

## 2021-02-03 PROCEDURE — 74011250636 HC RX REV CODE- 250/636: Performed by: UROLOGY

## 2021-02-03 PROCEDURE — 80048 BASIC METABOLIC PNL TOTAL CA: CPT

## 2021-02-03 PROCEDURE — 76210000016 HC OR PH I REC 1 TO 1.5 HR: Performed by: UROLOGY

## 2021-02-03 PROCEDURE — 83036 HEMOGLOBIN GLYCOSYLATED A1C: CPT

## 2021-02-03 PROCEDURE — 74011636637 HC RX REV CODE- 636/637: Performed by: ANESTHESIOLOGY

## 2021-02-03 PROCEDURE — 76010000133 HC OR TIME 3 TO 3.5 HR: Performed by: UROLOGY

## 2021-02-03 PROCEDURE — 74011250637 HC RX REV CODE- 250/637: Performed by: UROLOGY

## 2021-02-03 PROCEDURE — 74011000258 HC RX REV CODE- 258: Performed by: UROLOGY

## 2021-02-03 PROCEDURE — 82962 GLUCOSE BLOOD TEST: CPT

## 2021-02-03 PROCEDURE — 85027 COMPLETE CBC AUTOMATED: CPT

## 2021-02-03 PROCEDURE — 77030008603 HC TRCR ENDOSC EPATH J&J -C: Performed by: UROLOGY

## 2021-02-03 PROCEDURE — 88307 TISSUE EXAM BY PATHOLOGIST: CPT

## 2021-02-03 PROCEDURE — 65270000029 HC RM PRIVATE

## 2021-02-03 PROCEDURE — 77030005515 HC CATH URETH FOL14 BARD -B: Performed by: UROLOGY

## 2021-02-03 PROCEDURE — C9290 INJ, BUPIVACAINE LIPOSOME: HCPCS | Performed by: UROLOGY

## 2021-02-03 PROCEDURE — 77030022473 HC HNDL ENDO GIA UNIV USDA -C: Performed by: UROLOGY

## 2021-02-03 PROCEDURE — 77030009403 HC ELECTRD ENDO MEGA -B: Performed by: UROLOGY

## 2021-02-03 PROCEDURE — 74011000272 HC RX REV CODE- 272: Performed by: UROLOGY

## 2021-02-03 PROCEDURE — 76060000037 HC ANESTHESIA 3 TO 3.5 HR: Performed by: UROLOGY

## 2021-02-03 PROCEDURE — 77030031139 HC SUT VCRL2 J&J -A: Performed by: UROLOGY

## 2021-02-03 PROCEDURE — 74011000250 HC RX REV CODE- 250: Performed by: UROLOGY

## 2021-02-03 PROCEDURE — 81025 URINE PREGNANCY TEST: CPT

## 2021-02-03 PROCEDURE — 2709999900 HC NON-CHARGEABLE SUPPLY: Performed by: UROLOGY

## 2021-02-03 PROCEDURE — 77030010507 HC ADH SKN DERMBND J&J -B: Performed by: UROLOGY

## 2021-02-03 PROCEDURE — 36415 COLL VENOUS BLD VENIPUNCTURE: CPT

## 2021-02-03 PROCEDURE — 74011000250 HC RX REV CODE- 250: Performed by: NURSE ANESTHETIST, CERTIFIED REGISTERED

## 2021-02-03 PROCEDURE — 77030014008 HC SPNG HEMSTAT J&J -C: Performed by: UROLOGY

## 2021-02-03 PROCEDURE — 77030002966 HC SUT PDS J&J -A: Performed by: UROLOGY

## 2021-02-03 PROCEDURE — 77030022474 HC RELD STPLR ENDO GIA COVD -C: Performed by: UROLOGY

## 2021-02-03 PROCEDURE — 77030020782 HC GWN BAIR PAWS FLX 3M -B: Performed by: UROLOGY

## 2021-02-03 PROCEDURE — 77030009527 HC GEL PRT SYS AMR -E: Performed by: UROLOGY

## 2021-02-03 PROCEDURE — 77030040361 HC SLV COMPR DVT MDII -B: Performed by: UROLOGY

## 2021-02-03 PROCEDURE — 77030008462 HC STPLR SKN PROX J&J -A: Performed by: UROLOGY

## 2021-02-03 PROCEDURE — 74011636637 HC RX REV CODE- 636/637: Performed by: UROLOGY

## 2021-02-03 PROCEDURE — 74011250636 HC RX REV CODE- 250/636: Performed by: ANESTHESIOLOGY

## 2021-02-03 PROCEDURE — 77030008756 HC TU IRR SUC STRY -B: Performed by: UROLOGY

## 2021-02-03 PROCEDURE — 77030034154 HC SHR COAG HARM ACE J&J -F: Performed by: UROLOGY

## 2021-02-03 PROCEDURE — 77030011264 HC ELECTRD BLD EXT COVD -A: Performed by: UROLOGY

## 2021-02-03 PROCEDURE — 77030002933 HC SUT MCRYL J&J -A: Performed by: UROLOGY

## 2021-02-03 PROCEDURE — 0TT10ZZ RESECTION OF LEFT KIDNEY, OPEN APPROACH: ICD-10-PCS | Performed by: UROLOGY

## 2021-02-03 RX ORDER — NALOXONE HYDROCHLORIDE 0.4 MG/ML
0.4 INJECTION, SOLUTION INTRAMUSCULAR; INTRAVENOUS; SUBCUTANEOUS AS NEEDED
Status: DISCONTINUED | OUTPATIENT
Start: 2021-02-03 | End: 2021-02-05 | Stop reason: HOSPADM

## 2021-02-03 RX ORDER — FENTANYL CITRATE 50 UG/ML
INJECTION, SOLUTION INTRAMUSCULAR; INTRAVENOUS AS NEEDED
Status: DISCONTINUED | OUTPATIENT
Start: 2021-02-03 | End: 2021-02-03 | Stop reason: HOSPADM

## 2021-02-03 RX ORDER — LIDOCAINE HYDROCHLORIDE 20 MG/ML
INJECTION, SOLUTION EPIDURAL; INFILTRATION; INTRACAUDAL; PERINEURAL AS NEEDED
Status: DISCONTINUED | OUTPATIENT
Start: 2021-02-03 | End: 2021-02-03 | Stop reason: HOSPADM

## 2021-02-03 RX ORDER — GLYCOPYRROLATE 0.2 MG/ML
INJECTION INTRAMUSCULAR; INTRAVENOUS AS NEEDED
Status: DISCONTINUED | OUTPATIENT
Start: 2021-02-03 | End: 2021-02-03 | Stop reason: HOSPADM

## 2021-02-03 RX ORDER — HEPARIN SODIUM 5000 [USP'U]/ML
5000 INJECTION, SOLUTION INTRAVENOUS; SUBCUTANEOUS EVERY 12 HOURS
Status: DISCONTINUED | OUTPATIENT
Start: 2021-02-03 | End: 2021-02-05 | Stop reason: HOSPADM

## 2021-02-03 RX ORDER — ROCURONIUM BROMIDE 10 MG/ML
INJECTION, SOLUTION INTRAVENOUS AS NEEDED
Status: DISCONTINUED | OUTPATIENT
Start: 2021-02-03 | End: 2021-02-03 | Stop reason: HOSPADM

## 2021-02-03 RX ORDER — INSULIN LISPRO 100 [IU]/ML
INJECTION, SOLUTION INTRAVENOUS; SUBCUTANEOUS
Status: DISCONTINUED | OUTPATIENT
Start: 2021-02-03 | End: 2021-02-05 | Stop reason: HOSPADM

## 2021-02-03 RX ORDER — HYDROCODONE BITARTRATE AND ACETAMINOPHEN 5; 325 MG/1; MG/1
1 TABLET ORAL AS NEEDED
Status: DISCONTINUED | OUTPATIENT
Start: 2021-02-03 | End: 2021-02-03 | Stop reason: HOSPADM

## 2021-02-03 RX ORDER — SUCCINYLCHOLINE CHLORIDE 100 MG/5ML
SYRINGE (ML) INTRAVENOUS AS NEEDED
Status: DISCONTINUED | OUTPATIENT
Start: 2021-02-03 | End: 2021-02-03 | Stop reason: HOSPADM

## 2021-02-03 RX ORDER — INSULIN LISPRO 100 [IU]/ML
INJECTION, SOLUTION INTRAVENOUS; SUBCUTANEOUS ONCE
Status: COMPLETED | OUTPATIENT
Start: 2021-02-03 | End: 2021-02-03

## 2021-02-03 RX ORDER — CEFAZOLIN SODIUM/WATER 2 G/20 ML
2 SYRINGE (ML) INTRAVENOUS ONCE
Status: COMPLETED | OUTPATIENT
Start: 2021-02-03 | End: 2021-02-03

## 2021-02-03 RX ORDER — DOCUSATE SODIUM 100 MG/1
100 CAPSULE, LIQUID FILLED ORAL 2 TIMES DAILY
Status: DISCONTINUED | OUTPATIENT
Start: 2021-02-03 | End: 2021-02-05 | Stop reason: HOSPADM

## 2021-02-03 RX ORDER — FENTANYL CITRATE 50 UG/ML
25 INJECTION, SOLUTION INTRAMUSCULAR; INTRAVENOUS
Status: DISCONTINUED | OUTPATIENT
Start: 2021-02-03 | End: 2021-02-03 | Stop reason: HOSPADM

## 2021-02-03 RX ORDER — ONDANSETRON 2 MG/ML
4 INJECTION INTRAMUSCULAR; INTRAVENOUS
Status: DISCONTINUED | OUTPATIENT
Start: 2021-02-03 | End: 2021-02-05 | Stop reason: HOSPADM

## 2021-02-03 RX ORDER — SODIUM CHLORIDE, SODIUM LACTATE, POTASSIUM CHLORIDE, CALCIUM CHLORIDE 600; 310; 30; 20 MG/100ML; MG/100ML; MG/100ML; MG/100ML
150 INJECTION, SOLUTION INTRAVENOUS CONTINUOUS
Status: DISCONTINUED | OUTPATIENT
Start: 2021-02-03 | End: 2021-02-03 | Stop reason: HOSPADM

## 2021-02-03 RX ORDER — SODIUM CHLORIDE 450 MG/100ML
75 INJECTION, SOLUTION INTRAVENOUS CONTINUOUS
Status: DISCONTINUED | OUTPATIENT
Start: 2021-02-03 | End: 2021-02-05 | Stop reason: HOSPADM

## 2021-02-03 RX ORDER — VALSARTAN 160 MG/1
80 TABLET ORAL DAILY
Status: DISCONTINUED | OUTPATIENT
Start: 2021-02-03 | End: 2021-02-05 | Stop reason: HOSPADM

## 2021-02-03 RX ORDER — HYDROMORPHONE HYDROCHLORIDE 2 MG/ML
INJECTION, SOLUTION INTRAMUSCULAR; INTRAVENOUS; SUBCUTANEOUS AS NEEDED
Status: DISCONTINUED | OUTPATIENT
Start: 2021-02-03 | End: 2021-02-03 | Stop reason: HOSPADM

## 2021-02-03 RX ORDER — LORATADINE 10 MG/1
10 TABLET ORAL DAILY
Status: DISCONTINUED | OUTPATIENT
Start: 2021-02-03 | End: 2021-02-05 | Stop reason: HOSPADM

## 2021-02-03 RX ORDER — MAGNESIUM SULFATE 100 %
4 CRYSTALS MISCELLANEOUS AS NEEDED
Status: DISCONTINUED | OUTPATIENT
Start: 2021-02-03 | End: 2021-02-05 | Stop reason: HOSPADM

## 2021-02-03 RX ORDER — PHENYLEPHRINE HCL IN 0.9% NACL 1 MG/10 ML
SYRINGE (ML) INTRAVENOUS AS NEEDED
Status: DISCONTINUED | OUTPATIENT
Start: 2021-02-03 | End: 2021-02-03 | Stop reason: HOSPADM

## 2021-02-03 RX ORDER — HYDROMORPHONE HYDROCHLORIDE 1 MG/ML
0.2 INJECTION, SOLUTION INTRAMUSCULAR; INTRAVENOUS; SUBCUTANEOUS AS NEEDED
Status: DISCONTINUED | OUTPATIENT
Start: 2021-02-03 | End: 2021-02-03 | Stop reason: HOSPADM

## 2021-02-03 RX ORDER — ALBUTEROL SULFATE 0.83 MG/ML
2.5 SOLUTION RESPIRATORY (INHALATION) AS NEEDED
Status: DISCONTINUED | OUTPATIENT
Start: 2021-02-03 | End: 2021-02-03 | Stop reason: HOSPADM

## 2021-02-03 RX ORDER — TRIAMTERENE AND HYDROCHLOROTHIAZIDE 75; 50 MG/1; MG/1
1 TABLET ORAL DAILY
Status: DISCONTINUED | OUTPATIENT
Start: 2021-02-03 | End: 2021-02-05 | Stop reason: HOSPADM

## 2021-02-03 RX ORDER — PROPOFOL 10 MG/ML
INJECTION, EMULSION INTRAVENOUS AS NEEDED
Status: DISCONTINUED | OUTPATIENT
Start: 2021-02-03 | End: 2021-02-03 | Stop reason: HOSPADM

## 2021-02-03 RX ORDER — SODIUM CHLORIDE, SODIUM LACTATE, POTASSIUM CHLORIDE, CALCIUM CHLORIDE 600; 310; 30; 20 MG/100ML; MG/100ML; MG/100ML; MG/100ML
50 INJECTION, SOLUTION INTRAVENOUS CONTINUOUS
Status: DISCONTINUED | OUTPATIENT
Start: 2021-02-03 | End: 2021-02-05 | Stop reason: HOSPADM

## 2021-02-03 RX ORDER — NALOXONE HYDROCHLORIDE 0.4 MG/ML
0.1 INJECTION, SOLUTION INTRAMUSCULAR; INTRAVENOUS; SUBCUTANEOUS AS NEEDED
Status: DISCONTINUED | OUTPATIENT
Start: 2021-02-03 | End: 2021-02-03 | Stop reason: HOSPADM

## 2021-02-03 RX ORDER — DIPHENHYDRAMINE HYDROCHLORIDE 50 MG/ML
12.5 INJECTION, SOLUTION INTRAMUSCULAR; INTRAVENOUS
Status: DISCONTINUED | OUTPATIENT
Start: 2021-02-03 | End: 2021-02-03 | Stop reason: HOSPADM

## 2021-02-03 RX ORDER — ONDANSETRON 2 MG/ML
INJECTION INTRAMUSCULAR; INTRAVENOUS AS NEEDED
Status: DISCONTINUED | OUTPATIENT
Start: 2021-02-03 | End: 2021-02-03 | Stop reason: HOSPADM

## 2021-02-03 RX ORDER — CEFAZOLIN SODIUM/WATER 2 G/20 ML
2 SYRINGE (ML) INTRAVENOUS EVERY 8 HOURS
Status: COMPLETED | OUTPATIENT
Start: 2021-02-03 | End: 2021-02-04

## 2021-02-03 RX ORDER — SODIUM CHLORIDE 0.9 % (FLUSH) 0.9 %
5-40 SYRINGE (ML) INJECTION EVERY 8 HOURS
Status: DISCONTINUED | OUTPATIENT
Start: 2021-02-03 | End: 2021-02-03 | Stop reason: HOSPADM

## 2021-02-03 RX ORDER — METOPROLOL TARTRATE 50 MG/1
100 TABLET ORAL 2 TIMES DAILY
Status: DISCONTINUED | OUTPATIENT
Start: 2021-02-03 | End: 2021-02-05 | Stop reason: HOSPADM

## 2021-02-03 RX ORDER — MAGNESIUM SULFATE 100 %
4 CRYSTALS MISCELLANEOUS AS NEEDED
Status: DISCONTINUED | OUTPATIENT
Start: 2021-02-03 | End: 2021-02-03 | Stop reason: HOSPADM

## 2021-02-03 RX ORDER — DEXTROSE MONOHYDRATE 100 MG/ML
125-250 INJECTION, SOLUTION INTRAVENOUS AS NEEDED
Status: DISCONTINUED | OUTPATIENT
Start: 2021-02-03 | End: 2021-02-03 | Stop reason: HOSPADM

## 2021-02-03 RX ORDER — KETAMINE HYDROCHLORIDE 10 MG/ML
INJECTION, SOLUTION INTRAMUSCULAR; INTRAVENOUS AS NEEDED
Status: DISCONTINUED | OUTPATIENT
Start: 2021-02-03 | End: 2021-02-03 | Stop reason: HOSPADM

## 2021-02-03 RX ORDER — SODIUM CHLORIDE 0.9 % (FLUSH) 0.9 %
5-40 SYRINGE (ML) INJECTION AS NEEDED
Status: DISCONTINUED | OUTPATIENT
Start: 2021-02-03 | End: 2021-02-05 | Stop reason: HOSPADM

## 2021-02-03 RX ORDER — ACETAMINOPHEN 325 MG/1
650 TABLET ORAL
Status: DISCONTINUED | OUTPATIENT
Start: 2021-02-03 | End: 2021-02-05 | Stop reason: HOSPADM

## 2021-02-03 RX ORDER — ONDANSETRON 2 MG/ML
4 INJECTION INTRAMUSCULAR; INTRAVENOUS ONCE
Status: DISCONTINUED | OUTPATIENT
Start: 2021-02-03 | End: 2021-02-03 | Stop reason: HOSPADM

## 2021-02-03 RX ORDER — SODIUM CHLORIDE 0.9 % (FLUSH) 0.9 %
5-40 SYRINGE (ML) INJECTION EVERY 8 HOURS
Status: DISCONTINUED | OUTPATIENT
Start: 2021-02-03 | End: 2021-02-05 | Stop reason: HOSPADM

## 2021-02-03 RX ORDER — NEOSTIGMINE METHYLSULFATE 1 MG/ML
INJECTION, SOLUTION INTRAVENOUS AS NEEDED
Status: DISCONTINUED | OUTPATIENT
Start: 2021-02-03 | End: 2021-02-03 | Stop reason: HOSPADM

## 2021-02-03 RX ORDER — SODIUM CHLORIDE 0.9 % (FLUSH) 0.9 %
5-40 SYRINGE (ML) INJECTION AS NEEDED
Status: DISCONTINUED | OUTPATIENT
Start: 2021-02-03 | End: 2021-02-03 | Stop reason: HOSPADM

## 2021-02-03 RX ORDER — HYDROMORPHONE HYDROCHLORIDE 1 MG/ML
1 INJECTION, SOLUTION INTRAMUSCULAR; INTRAVENOUS; SUBCUTANEOUS
Status: DISCONTINUED | OUTPATIENT
Start: 2021-02-03 | End: 2021-02-05 | Stop reason: HOSPADM

## 2021-02-03 RX ORDER — SODIUM CHLORIDE, SODIUM LACTATE, POTASSIUM CHLORIDE, CALCIUM CHLORIDE 600; 310; 30; 20 MG/100ML; MG/100ML; MG/100ML; MG/100ML
125 INJECTION, SOLUTION INTRAVENOUS CONTINUOUS
Status: DISCONTINUED | OUTPATIENT
Start: 2021-02-03 | End: 2021-02-05 | Stop reason: HOSPADM

## 2021-02-03 RX ORDER — MIDAZOLAM HYDROCHLORIDE 1 MG/ML
INJECTION, SOLUTION INTRAMUSCULAR; INTRAVENOUS AS NEEDED
Status: DISCONTINUED | OUTPATIENT
Start: 2021-02-03 | End: 2021-02-03 | Stop reason: HOSPADM

## 2021-02-03 RX ORDER — DEXTROSE MONOHYDRATE 100 MG/ML
125-250 INJECTION, SOLUTION INTRAVENOUS AS NEEDED
Status: DISCONTINUED | OUTPATIENT
Start: 2021-02-03 | End: 2021-02-05 | Stop reason: HOSPADM

## 2021-02-03 RX ORDER — AMLODIPINE BESYLATE 5 MG/1
10 TABLET ORAL DAILY
Status: DISCONTINUED | OUTPATIENT
Start: 2021-02-04 | End: 2021-02-05 | Stop reason: HOSPADM

## 2021-02-03 RX ORDER — OXYCODONE AND ACETAMINOPHEN 7.5; 325 MG/1; MG/1
1 TABLET ORAL
Status: DISCONTINUED | OUTPATIENT
Start: 2021-02-03 | End: 2021-02-05 | Stop reason: HOSPADM

## 2021-02-03 RX ORDER — LORAZEPAM 1 MG/1
1 TABLET ORAL
Status: DISCONTINUED | OUTPATIENT
Start: 2021-02-03 | End: 2021-02-05 | Stop reason: HOSPADM

## 2021-02-03 RX ORDER — EPHEDRINE SULFATE/0.9% NACL/PF 50 MG/5 ML
SYRINGE (ML) INTRAVENOUS AS NEEDED
Status: DISCONTINUED | OUTPATIENT
Start: 2021-02-03 | End: 2021-02-03 | Stop reason: HOSPADM

## 2021-02-03 RX ADMIN — INSULIN LISPRO 2 UNITS: 100 INJECTION, SOLUTION INTRAVENOUS; SUBCUTANEOUS at 17:23

## 2021-02-03 RX ADMIN — ROCURONIUM BROMIDE 5 MG: 10 INJECTION, SOLUTION INTRAVENOUS at 07:06

## 2021-02-03 RX ADMIN — HYDROMORPHONE HYDROCHLORIDE 1 MG: 1 INJECTION, SOLUTION INTRAMUSCULAR; INTRAVENOUS; SUBCUTANEOUS at 21:25

## 2021-02-03 RX ADMIN — Medication 180 MG: at 07:07

## 2021-02-03 RX ADMIN — ONDANSETRON HYDROCHLORIDE 4 MG: 2 INJECTION INTRAMUSCULAR; INTRAVENOUS at 09:01

## 2021-02-03 RX ADMIN — Medication 150 MCG: at 07:37

## 2021-02-03 RX ADMIN — HEPARIN SODIUM 5000 UNITS: 5000 INJECTION INTRAVENOUS; SUBCUTANEOUS at 12:19

## 2021-02-03 RX ADMIN — Medication 3 G: at 07:11

## 2021-02-03 RX ADMIN — Medication 5 MG: at 08:09

## 2021-02-03 RX ADMIN — SODIUM CHLORIDE, SODIUM LACTATE, POTASSIUM CHLORIDE, AND CALCIUM CHLORIDE 50 ML/HR: 600; 310; 30; 20 INJECTION, SOLUTION INTRAVENOUS at 06:14

## 2021-02-03 RX ADMIN — GLYCOPYRROLATE 0.8 MG: 0.2 INJECTION INTRAMUSCULAR; INTRAVENOUS at 09:54

## 2021-02-03 RX ADMIN — HYDROMORPHONE HYDROCHLORIDE 1 MG: 2 INJECTION, SOLUTION INTRAMUSCULAR; INTRAVENOUS; SUBCUTANEOUS at 09:33

## 2021-02-03 RX ADMIN — SODIUM CHLORIDE 100 ML/HR: 450 INJECTION, SOLUTION INTRAVENOUS at 21:38

## 2021-02-03 RX ADMIN — SODIUM CHLORIDE, SODIUM LACTATE, POTASSIUM CHLORIDE, AND CALCIUM CHLORIDE 125 ML/HR: 600; 310; 30; 20 INJECTION, SOLUTION INTRAVENOUS at 05:55

## 2021-02-03 RX ADMIN — TRIAMTERENE AND HYDROCHLOROTHIAZIDE 1 TABLET: 75; 50 TABLET ORAL at 15:03

## 2021-02-03 RX ADMIN — Medication 100 MCG: at 07:33

## 2021-02-03 RX ADMIN — INSULIN LISPRO 3 UNITS: 100 INJECTION, SOLUTION INTRAVENOUS; SUBCUTANEOUS at 10:49

## 2021-02-03 RX ADMIN — LIDOCAINE HYDROCHLORIDE 100 MG: 20 INJECTION, SOLUTION EPIDURAL; INFILTRATION; INTRACAUDAL; PERINEURAL at 07:07

## 2021-02-03 RX ADMIN — FENTANYL CITRATE 100 MCG: 50 INJECTION, SOLUTION INTRAMUSCULAR; INTRAVENOUS at 07:03

## 2021-02-03 RX ADMIN — FENTANYL CITRATE 25 MCG: 50 INJECTION, SOLUTION INTRAMUSCULAR; INTRAVENOUS at 10:45

## 2021-02-03 RX ADMIN — PROPOFOL 250 MG: 10 INJECTION, EMULSION INTRAVENOUS at 07:07

## 2021-02-03 RX ADMIN — DOCUSATE SODIUM 100 MG: 100 CAPSULE, LIQUID FILLED ORAL at 12:19

## 2021-02-03 RX ADMIN — ROCURONIUM BROMIDE 10 MG: 10 INJECTION, SOLUTION INTRAVENOUS at 08:44

## 2021-02-03 RX ADMIN — HEPARIN SODIUM 5000 UNITS: 5000 INJECTION INTRAVENOUS; SUBCUTANEOUS at 23:23

## 2021-02-03 RX ADMIN — KETAMINE HYDROCHLORIDE 10 MG: 10 INJECTION, SOLUTION INTRAMUSCULAR; INTRAVENOUS at 08:47

## 2021-02-03 RX ADMIN — Medication 5 MG: at 08:07

## 2021-02-03 RX ADMIN — ONDANSETRON 4 MG: 2 INJECTION INTRAMUSCULAR; INTRAVENOUS at 14:58

## 2021-02-03 RX ADMIN — KETAMINE HYDROCHLORIDE 20 MG: 10 INJECTION, SOLUTION INTRAMUSCULAR; INTRAVENOUS at 08:34

## 2021-02-03 RX ADMIN — METOPROLOL TARTRATE 100 MG: 50 TABLET, FILM COATED ORAL at 21:25

## 2021-02-03 RX ADMIN — HYDROMORPHONE HYDROCHLORIDE 0.2 MG: 1 INJECTION, SOLUTION INTRAMUSCULAR; INTRAVENOUS; SUBCUTANEOUS at 11:12

## 2021-02-03 RX ADMIN — ROCURONIUM BROMIDE 10 MG: 10 INJECTION, SOLUTION INTRAVENOUS at 08:11

## 2021-02-03 RX ADMIN — Medication 150 MCG: at 07:35

## 2021-02-03 RX ADMIN — Medication 4 MG: at 09:54

## 2021-02-03 RX ADMIN — KETAMINE HYDROCHLORIDE 20 MG: 10 INJECTION, SOLUTION INTRAMUSCULAR; INTRAVENOUS at 08:44

## 2021-02-03 RX ADMIN — MIDAZOLAM 2 MG: 1 INJECTION INTRAMUSCULAR; INTRAVENOUS at 07:00

## 2021-02-03 RX ADMIN — Medication 10 ML: at 13:55

## 2021-02-03 RX ADMIN — Medication 10 MG: at 07:44

## 2021-02-03 RX ADMIN — DOCUSATE SODIUM 100 MG: 100 CAPSULE, LIQUID FILLED ORAL at 21:26

## 2021-02-03 RX ADMIN — SODIUM CHLORIDE, SODIUM LACTATE, POTASSIUM CHLORIDE, AND CALCIUM CHLORIDE: 600; 310; 30; 20 INJECTION, SOLUTION INTRAVENOUS at 08:39

## 2021-02-03 RX ADMIN — VALSARTAN 80 MG: 160 TABLET, FILM COATED ORAL at 15:03

## 2021-02-03 RX ADMIN — HYDROMORPHONE HYDROCHLORIDE 1 MG: 1 INJECTION, SOLUTION INTRAMUSCULAR; INTRAVENOUS; SUBCUTANEOUS at 12:31

## 2021-02-03 RX ADMIN — SODIUM CHLORIDE 100 ML/HR: 450 INJECTION, SOLUTION INTRAVENOUS at 12:10

## 2021-02-03 RX ADMIN — LORATADINE 10 MG: 10 TABLET ORAL at 15:03

## 2021-02-03 RX ADMIN — ROCURONIUM BROMIDE 45 MG: 10 INJECTION, SOLUTION INTRAVENOUS at 07:14

## 2021-02-03 RX ADMIN — Medication 10 ML: at 21:27

## 2021-02-03 RX ADMIN — HYDROMORPHONE HYDROCHLORIDE 1 MG: 1 INJECTION, SOLUTION INTRAMUSCULAR; INTRAVENOUS; SUBCUTANEOUS at 18:24

## 2021-02-03 RX ADMIN — ROCURONIUM BROMIDE 10 MG: 10 INJECTION, SOLUTION INTRAVENOUS at 07:39

## 2021-02-03 RX ADMIN — Medication 2 G: at 23:23

## 2021-02-03 RX ADMIN — Medication 2 G: at 15:02

## 2021-02-03 NOTE — PROGRESS NOTES
1128  TRANSFER - IN REPORT:    Verbal report received from BOOKER Harper RN(name) on Alma Davey  being received from [facility](unit) for routine progression of care      Report consisted of patients Situation, Background, Assessment and   Recommendations(SBAR). Information from the following report(s) SBAR, OR Summary, Procedure Summary, Intake/Output, MAR and Recent Results was reviewed with the receiving nurse. Opportunity for questions and clarification was provided. Assessment completed upon patients arrival to unit and care assumed.

## 2021-02-03 NOTE — PERIOP NOTES
Reviewed PTA medication list with patient/caregiver and patient/caregiver denies any additional medications. Patient admits to having a responsible adult care for them at home for at least 24 hours after surgery. Patient encouraged to use gown warming system and informed that using said warming gown to regulate body temperature prior to a procedure has been shown to help reduce the risks of blood clots and infection. Dual skin assessment & fall risk band verification completed with CARIN Mascorro RN. Urine pregnancy results negative and verified with CARIN Mascorro RN.

## 2021-02-03 NOTE — ANESTHESIA PREPROCEDURE EVALUATION
Relevant Problems   No relevant active problems       Anesthetic History   No history of anesthetic complications            Review of Systems / Medical History  Patient summary reviewed, nursing notes reviewed and pertinent labs reviewed    Pulmonary            Asthma : well controlled       Neuro/Psych   Within defined limits           Cardiovascular    Hypertension              Exercise tolerance: >4 METS     GI/Hepatic/Renal           Liver disease     Endo/Other    Diabetes    Morbid obesity     Other Findings              Physical Exam    Airway  Mallampati: II  TM Distance: 4 - 6 cm  Neck ROM: normal range of motion   Mouth opening: Normal     Cardiovascular  Regular rate and rhythm,  S1 and S2 normal,  no murmur, click, rub, or gallop             Dental  No notable dental hx       Pulmonary  Breath sounds clear to auscultation               Abdominal  GI exam deferred       Other Findings            Anesthetic Plan    ASA: 3  Anesthesia type: general          Induction: Intravenous  Anesthetic plan and risks discussed with: Patient

## 2021-02-03 NOTE — DISCHARGE INSTRUCTIONS
2 [facility], Urology     [facility], [address], [address] [facility], [place]  Office: [phone]  Fax:    [phone] assisted Laparoscopic radical left nephrectomy  __  [facility] IMMEDIATELY if any of the following occur:      Temperature above 101.5° and / or chills.   You are nauseous and / or vomiting and you cannot hold down any fluids.   Your pain is not controlled with the pain medication prescribed. Special Considerations:      Do not drive for at least 2 weeks after the procedure and until you are no longer taking narcotic pain medication and you are able to move and react without hesitation.  You may return to work in 4  weeks.  _x_  No heavy lifting over 10 pounds & no strenuous exercise for 4 weeks    _x_  Other instructions:  Ok to shower 24 hours after surgery. No soaking in bath tub or swimming for 4 weeks. Make sure to drink at least 1 1/2 L of water a day to hydrate. Keep ambulating at home at least 3 times a day.  _x_  Our office will call you to make an appointment in 1-2 weeks. Please contact [facility] at [phone] or go to the nearest Emergency Department / Urgent Care facility for any other medical questions or concerns.

## 2021-02-03 NOTE — PROGRESS NOTES
Problem: Falls - Risk of  Goal: *Absence of Falls  Description: Document Sharon Fall Risk and appropriate interventions in the flowsheet.  Outcome: Progressing Towards Goal  Note: Fall Risk Interventions:  Mobility Interventions: Assess mobility with egress test         Medication Interventions: Teach patient to arise slowly    Elimination Interventions: Call light in reach    History of Falls Interventions: Investigate reason for fall

## 2021-02-03 NOTE — H&P
Update History & Physical    The Patient's History and Physical of January 19/2021, was reviewed with the patient and I examined the patient. There was no change. The surgical site was confirmed by the patient and me. Plan:  The risk, benefits, expected outcome, and alternative to the recommended procedure have been discussed with the patient. Patient understands and wants to proceed with the procedure. Electronically signed by Olga Quiroz MD on 2/3/2021 at 6:32 AM      [facility] [id number]  Tel: [phone]  Fax: [phone]      Patient: Malaika Oakley  YOB: 1980  Date: 01/19/2021 2:45 PM   Visit Type: Office Visit        Assessment/Plan  # Detail Type Description    1. 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 Patient Plan  I discussed the risk of surgery and the benefits. We discussed risk including but not limited to bleeding with potential need for transfusion, wound infection, UTI, Pulmonary embolism/DVT, pneumonia and MI. Hospital stay was discussed. Pt understands and wishes to proceed. All questions were answered. She will be scheduled for left hand assisted radical nephrectomy, possible open. All questions were answered in detail. High risk surgery in high risk patient. Total time counseling and coordinating care 40min. This 41year old female presents for Renal Mass. History of Present Illness:  1. Renal Mass   Onset was 1 month ago. Pain level is no pain. The problem occurs constantly and has not changed. A single mass is present. Quality of the mass: cystic lesion and solid mass. There are no identifying factors. No treatment has been performed. Pertinent negatives include fever, headache, hematuria, nausea, vomiting and weight loss.   Additional information: Baseline HCT=47.8   creat = 0.9    ca = 9.3   Alk Phos = 134     Alt=  86     AST = 42 (10/7/2020). CT (12/23/2020):  1. 6.2 x 5.4 x 5.7 cm solid and cystic enhancing left renal mass is most consistent with   a cystic renal neoplasm. 2. Mild hepatic steatosis. 1/19/21-Pt is referred by Dr. Junaid Britt for surgical mgt of her left renal mass. I reviewed the WBBS and CXR 1/12/21 with the patient and mother which does not show any metastatic disease. PAST MEDICAL/SURGICAL HISTORY   (Reviewed, updated)    Disease/disorder Onset Date Management Date Comments   Asthma       Depression       Headache, migraine       Hypertension         Colectomy           PROBLEM LIST:   Problem List reviewed.    Problem Description Onset Date Chronic Clinical Status Notes   Hypertension 11/05/2019 N     Hyperglycemia 11/05/2019 N     Plantar fasciitis 11/05/2019 N     Abnormal liver function tests 11/05/2019 N     Morbid obesity 11/05/2019 N     Atopic rhinitis 11/05/2019 N     IUD birth control 11/05/2019 N     Abnormal uterine bleeding 11/05/2019 N     Fatigue 11/05/2019 N     Chest pain 11/05/2019 N     Unstable ankle 11/05/2019 N     Hypertrophy of breast 11/05/2019 N     Vitamin D deficiency 11/05/2019 N     PCOS- polycystic ovary syndrome 11/05/2019 N     Hyperlipidemia 11/05/2019 N     Fatty liver 11/05/2019 N     Hypokalemia 11/05/2019 N     Goiter 11/05/2019 N     Sleep disorder 11/05/2019 N     Eczema 11/05/2019 N     Anxiety disorder 11/05/2019 N     Chronic depression 11/05/2019 N     Marginal perforation of tympanic membrane 11/05/2019 N     Tension headache 11/05/2019 N     Palpitations 11/05/2019 N           Medications (active prior to today)  Medication Name Sig Description Start Date Stop Date Refilled Rx Elsewhere   fluticasone propionate 50 mcg/actuation nasal spray,suspension spray 1 - 2 spray by intranasal route  every day in each nostril as needed 11/05/2019   N   cyanocobalamin (vit B-12) 1,000 mcg/mL injection solution inject 1 milliliter by intramuscular route  every month 11/05/2019   N   Mirena 20 mcg/24 hours (5 yrs) 52 mg intrauterine device  11/05/2019   N   loratadine 10 mg tablet take 1 tablet by oral route  every day 12/03/2019 12/03/2019 N   triamcinolone acetonide 0.1 % topical cream apply by topical route 2 times every day a thin layer to the affected area(s) 02/05/2020 02/05/2020 N   nystatin 100,000 unit/gram topical cream apply by topical route 2 times every day to the affected area(s) 02/17/2020   N   Norvasc 10 mg tablet TAKE 1 TABLET BY MOUTH EVERY DAY 03/16/2020 03/16/2020 N   valsartan 80 mg tablet take 1 tablet by oral route  every day 05/05/2020   N   VITAMIN D2 1.25MG(50,000 UNIT) TAKE 1-2 CAPSULES BY MOUTH WEEKLY 05/26/2020 05/26/2020 N   ergocalciferol (vitamin D2) 1,250 mcg (50,000 unit) capsule take 1 capsule by oral route  every week 09/08/2020 09/08/2020 N   Maxzide 75 mg-50 mg tablet take 1 tablet by oral route  every day 10/07/2020  10/07/2020 N   metoprolol tartrate 100 mg tablet take 1 tablet by oral route 2 times every day with meals 10/07/2020  10/07/2020 N   Ventolin HFA 90 mcg/actuation aerosol inhaler inhale 2 puff by inhalation route  every 4 - 6 hours as needed 10/07/2020  10/07/2020 N   metformin  mg tablet,extended release 24 hr take 2 tablet by oral route  every day with the evening meal 10/19/2020   N   OMEPRAZOLE DR 20 MG CAPSULE TAKE 1 CAP BY MOUTH EVERY DAY 11/04/2020 11/04/2020 N     Medication Reconciliation  Medications reconciled today.   Medication Reviewed  Adherence Medication Name Sig Desc Elsewhere Status   taking as directed fluticasone propionate 50 mcg/actuation nasal spray,suspension spray 1 - 2 spray by intranasal route  every day in each nostril as needed N Verified   taking as directed cyanocobalamin (vit B-12) 1,000 mcg/mL injection solution inject 1 milliliter by intramuscular route  every month N Verified   taking as directed Mirena 20 mcg/24 hours (5 yrs) 52 mg intrauterine device  N Verified   taking as directed loratadine 10 mg tablet take 1 tablet by oral route  every day N Verified   taking as directed triamcinolone acetonide 0.1 % topical cream apply by topical route 2 times every day a thin layer to the affected area(s) N Verified   taking as directed Norvasc 10 mg tablet TAKE 1 TABLET BY MOUTH EVERY DAY N Verified   taking as directed nystatin 100,000 unit/gram topical cream apply by topical route 2 times every day to the affected area(s) N Verified   taking as directed valsartan 80 mg tablet take 1 tablet by oral route  every day N Verified   taking as directed VITAMIN D2 1.25MG(50,000 UNIT) TAKE 1-2 CAPSULES BY MOUTH WEEKLY N Verified   taking as directed ergocalciferol (vitamin D2) 1,250 mcg (50,000 unit) capsule take 1 capsule by oral route  every week N Verified   taking as directed metoprolol tartrate 100 mg tablet take 1 tablet by oral route 2 times every day with meals N Verified   taking as directed Maxzide 75 mg-50 mg tablet take 1 tablet by oral route  every day N Verified   taking as directed Ventolin HFA 90 mcg/actuation aerosol inhaler inhale 2 puff by inhalation route  every 4 - 6 hours as needed N Verified   taking as directed metformin  mg tablet,extended release 24 hr take 2 tablet by oral route  every day with the evening meal N Verified   taking as directed OMEPRAZOLE DR 20 MG CAPSULE TAKE 1 CAP BY MOUTH EVERY DAY N Verified     Allergies  Ingredient Reaction (Severity) Medication Name Comment   LISINOPRIL        Reviewed, no changes. Family History  (Reviewed, updated)  Relationship Family Member Name  Age at Death Condition Onset Age Cause of Death       Family history of Hypertension  N       Family history of Cancer, unknown  N       Family history of Diabetes mellitus  N         Social History:  (Reviewed, updated)  Tobacco use reviewed. The patient is right-handed. Preferred language is [place].     MARITAL STATUS/FAMILY/SOCIAL SUPPORT  Marital status: Single Tobacco use status: Occasional cigarette smoker. Smoking status: Current some day smoker. TOBACCO SCREENING:  Patient has used tobacco.     SMOKING STATUS  Type Smoking Status Usage Per Day Years Used Pack Years Total Pack Years   Cigarette Current some day smoker         ALCOHOL  There is a history of alcohol use. CAFFEINE  The patient does not use caffeine. Review of Systems  System Neg/Pos Details   Constitutional Negative Chills, Fever and Weight loss. ENMT Negative Ear infections and Sore throat. Eyes Negative Blurred vision, Double vision and Eye pain. Respiratory Negative Asthma, Chronic cough, Dyspnea and Wheezing. Cardio Negative Chest pain. GI Negative Constipation, Decreased appetite, Diarrhea, Nausea and Vomiting.  Negative Hematuria. Endocrine Negative Cold intolerance, Heat intolerance, Increased thirst and Weight loss. Neuro Negative Headache and Tremors. Psych Negative Anxiety and Depression. Integumentary Negative Itching skin and Rash. MS Negative Back pain and Joint pain. Hema/Lymph Negative Easy bleeding. Vital Signs   Height  Time ft in cm Last Measured Height Position   3:34 PM 5.0 3.00 160.02 01/04/2021 0   Weight/BSA/BMI  Time lb oz kg Context BMI kg/m2 BSA m2   3:34 .00  116.573  45.52    Measured By  Time Measured by   3:34 PM Beckie Cotter     Physical Exam  Exam Findings Details   Constitutional Normal Well developed. Abdomen * Obese. Genitourinary Normal No Suprapubic tenderness. No CVA tenderness. Musculoskeletal Normal Visual overview of all four extremities is normal. Gait - Normal.   Neurological Normal Level of consciousness - Normal. Orientation - Normal. Memory - Normal.   Psychiatric Normal Oriented to time, place, person and situation. Appropriate mood and affect.          Medications (added, continued, or stopped today)  Start Date Medication Directions PRN Status PRN Reason Instruction Stop Date   11/05/2019 cyanocobalamin (vit B-12) 1,000 mcg/mL injection solution inject 1 milliliter by intramuscular route  every month N      09/08/2020 ergocalciferol (vitamin D2) 1,250 mcg (50,000 unit) capsule take 1 capsule by oral route  every week N      11/05/2019 fluticasone propionate 50 mcg/actuation nasal spray,suspension spray 1 - 2 spray by intranasal route  every day in each nostril as needed N      12/03/2019 loratadine 10 mg tablet take 1 tablet by oral route  every day N      10/07/2020 Maxzide 75 mg-50 mg tablet take 1 tablet by oral route  every day N      10/19/2020 metformin  mg tablet,extended release 24 hr take 2 tablet by oral route  every day with the evening meal N      10/07/2020 metoprolol tartrate 100 mg tablet take 1 tablet by oral route 2 times every day with meals N      11/05/2019 Mirena 20 mcg/24 hours (5 yrs) 52 mg intrauterine device  N      03/16/2020 Norvasc 10 mg tablet TAKE 1 TABLET BY MOUTH EVERY DAY N      02/17/2020 nystatin 100,000 unit/gram topical cream apply by topical route 2 times every day to the affected area(s) N      11/04/2020 OMEPRAZOLE DR 20 MG CAPSULE TAKE 1 CAP BY MOUTH EVERY DAY N      02/05/2020 triamcinolone acetonide 0.1 % topical cream apply by topical route 2 times every day a thin layer to the affected area(s) N      05/05/2020 valsartan 80 mg tablet take 1 tablet by oral route  every day N      10/07/2020 Ventolin HFA 90 mcg/actuation aerosol inhaler inhale 2 puff by inhalation route  every 4 - 6 hours as needed N      05/26/2020 VITAMIN D2 1.25MG(50,000 UNIT) TAKE 1-2 CAPSULES BY MOUTH WEEKLY N      Total time of the encounter was 40 minutes, which includes time spent face-to-face with the patient as well as non-face-to-face time personally spent by the physician and/or other qualified health care professional.        Active Patient Care Team Members    Name Contact Agency Type Support Role Relationship Active Date Inactive Date Specialty   [facility] Contact Other      Stacie Barros   Patient provider PCP   Family Practice       Provider: Blanca Santiago MD 01/19/2021 02:45 PM  Document generated by:  Baldomero Le 01/22/2021 08:55 AM        Electronically signed by Blanca Santiago MD on 01/22/2021 10:04 AM

## 2021-02-03 NOTE — PERIOP NOTES
TRANSFER - OUT REPORT:    Verbal report given to Uncil RN (name) on Christopher Hurley  being transferred to [facility](unit) for routine post - op       Report consisted of patients Situation, Background, Assessment and   Recommendations(SBAR). Information from the following report(s) SBAR, OR Summary, Procedure Summary, Intake/Output, MAR, Recent Results and Cardiac Rhythm NSR was reviewed with the receiving nurse. Lines:   Peripheral IV 02/03/21 Left;Posterior Hand (Active)   Site Assessment Clean, dry, & intact 02/03/21 1059   Phlebitis Assessment 0 02/03/21 1059   Infiltration Assessment 0 02/03/21 1059   Dressing Status Clean, dry, & intact 02/03/21 1059   Dressing Type Transparent 02/03/21 1059   Hub Color/Line Status Infusing 02/03/21 1059   Alcohol Cap Used No 02/03/21 0559       Peripheral IV 02/03/21 Anterior;Right Forearm (Active)   Site Assessment Clean, dry, & intact 02/03/21 1059   Phlebitis Assessment 0 02/03/21 1059   Infiltration Assessment 0 02/03/21 1059   Dressing Status Clean, dry, & intact 02/03/21 1059   Dressing Type Transparent 02/03/21 1059   Hub Color/Line Status Infusing 02/03/21 1059   Alcohol Cap Used No 02/03/21 6286        Opportunity for questions and clarification was provided.       Patient transported with:   O2 @ 2 liters         Intake/Output Summary (Last 24 hours) at 2/3/2021 1133  Last data filed at 2/3/2021 1059  Gross per 24 hour   Intake 2700 ml   Output 380 ml   Net 2320 ml

## 2021-02-03 NOTE — ANESTHESIA POSTPROCEDURE EVALUATION
Procedure(s):  LEFT HAND ASSISTED LAPAROSCOPIC RADICAL NEPHRECTOMY. general    Anesthesia Post Evaluation      Multimodal analgesia: multimodal analgesia used between 6 hours prior to anesthesia start to PACU discharge  Patient location during evaluation: PACU  Patient participation: complete - patient participated  Level of consciousness: awake  Pain management: adequate  Airway patency: patent  Anesthetic complications: no  Cardiovascular status: acceptable  Respiratory status: acceptable  Hydration status: acceptable  Post anesthesia nausea and vomiting:  none  Final Post Anesthesia Temperature Assessment:  Normothermia (36.0-37.5 degrees C)      INITIAL Post-op Vital signs:   Vitals Value Taken Time   /75 02/03/21 1115   Temp 36.3 °C (97.3 °F) 02/03/21 1050   Pulse 68 02/03/21 1118   Resp 19 02/03/21 1118   SpO2 95 % 02/03/21 1118   Vitals shown include unvalidated device data.

## 2021-02-03 NOTE — PROGRESS NOTES
Transition of Care (YAN) Plan:    Home with physician follow up     Chart reviewed.  Pt admitted for an elective surgical procedure (LEFT HAND ASSISTED LAPAROSCOPIC RADICAL NEPHRECTOMY).  Pt is independent.  Please encourage ambulation.  No transition of care needs identified at this time.  Anticipate pt will be medically stable for discharge within the next 24-48 hours with physician follow up.  CM available to assist as needed.        YAN Transportation:   How is patient being transported at discharge? Family/Friend      When? Once cleared by physician     Is transport scheduled? N/A      Follow-up appointment and transportation:   PCP/Specialist?  See AVS for Appointment         Who is transporting to the follow-up appointment? Self/Family/Friend      Is transport for follow up appointment scheduled? N/A    Communication plan (with patient/family):    Who is being called?  Patient or Next of Kin?  Responsible party?     Patient      What number(s) is to be used?  See Facesheet      What service provider is calling for YAN services?              When are they calling?      Readmission Risk?  (Green/Low; Yellow/Moderate; Red/High):  Green    Care Management Interventions  Mode of Transport at Discharge: Other (see comment)(Family)  Transition of Care Consult (CM Consult): Discharge Planning  Health Maintenance Reviewed: Yes  Current Support Network: Family Lives Nearby  The Plan for Transition of Care is Related to the Following Treatment Goals : Home with physician follow up   Discharge Location  Discharge Placement: Home with family assistance

## 2021-02-03 NOTE — PROGRESS NOTES
Transferred Pt to [facility], Uncil RN at bedside. Dressing CDI. Left pt stable.      02/03/21 1145   Modified Carmen Score   Activity 2   Respiration 2   Circulation 2   Consciousness 1   O2 Saturation 1   Carmen Score 8   Vital Signs   Pulse (Heart Rate) 68   Resp Rate 18   /60   Oxygen Therapy   O2 Sat (%) 97 %

## 2021-02-03 NOTE — PROGRESS NOTES
Comprehensive Nutrition Assessment    Type and Reason for Visit: Initial, Positive nutrition screen    Nutrition Recommendations/Plan: Diet: Adv diet per MD; Avoid prolonged Clear Liquid diet as it does not meet estimated needs    Nutrition Assessment:  pt admitted w/ renal mass-s/p surgery        Estimated Daily Nutrient Needs:  Energy (kcal): 1568; Weight Used for Energy Requirements: Ideal  Protein (g): 32-52; Weight Used for Protein Requirements: Ideal(0.6-1.0)  Fluid (ml/day): 1568; Method Used for Fluid Requirements: 1 ml/kcal      Nutrition Related Findings:  Labs: GFR est AA-53 BUN-34 +BM 2/3/21 Meds: colace, humalog, lopressor, zofran per MD note pt has had nausea, vomiting and weight loss      Wounds:    Surgical incision       Current Nutrition Therapies:  DIET CLEAR LIQUID    Anthropometric Measures:  Height:  5' 3\" (160 cm)  Current Body Wt:  115.2 kg (254 lb)   Admission Body Wt:       Usual Body Wt:  123.4 kg (272 lb)(6/2019)     Ideal Body Wt:  115 lbs:  220.9 %   Adjusted Body Weight:   ; Weight Adjustment for:     Adjusted BMI:       BMI Category:  Obese class 3 (BMI 40.0 or greater)       Nutrition Diagnosis:   Inadequate oral intake related to altered GI function, acute injury/trauma as evidenced by poor intake prior to admission    Nutrition Interventions:   Food and/or Nutrient Delivery: Modify current diet  Nutrition Education and Counseling: No recommendations at this time  Coordination of Nutrition Care: Continue to monitor while inpatient, Interdisciplinary rounds    Goals: Adv diet per MD in the next 1-5days       Nutrition Monitoring and Evaluation:   Behavioral-Environmental Outcomes:    Food/Nutrient Intake Outcomes: Diet advancement/tolerance, Food and nutrient intake  Physical Signs/Symptoms Outcomes: Biochemical data, Hemodynamic status, Weight, Skin, GI status    Discharge Planning:     Too soon to determine     Electronically signed by Camila Aviles on 2/3/2021 at 1:15 PM

## 2021-02-03 NOTE — OP NOTES
Patient: Rosibel Guerrero  YOB: 1980  MRN: [id number]    Date of Procedure: 2/3/2021     Pre-Op Diagnosis: LEFT RENAL MASS, MORBIDLY OBESE, DM II    Post-Op Diagnosis: Same as preoperative diagnosis. Procedure(s):  LEFT HAND ASSISTED LAPAROSCOPIC RADICAL NEPHRECTOMY    Surgeon(s):  Cordelia Jones MD    Surgical Assistant: Surg Asst-1: Jin Damon    Anesthesia: General     Estimated Blood Loss (mL): 433     Complications: None    Specimens:   ID Type Source Tests Collected by Time Destination   1 : LEFT KIDNEY Preservative Kidney, Left  Ani, MD Jaxson 2/3/2021 6235 Pathology        Implants: None  Drains: None    Findings: Fatty left kidney. OPERATIVE COURSE:    On the day of the operation, the patient signed the consent form with the risks, benefits, and alternatives that were explained to him. She was then brought to the operative theater and placed on the table in the supine position. Once she was then sufficiently anesthetized with a general anesthetic, a time out was performed and 3 grams of IV cephazolin was given to the patient prior to the procedure. We then made a 7.5 cm oblique incision infraumbilically and the linea alba was identified and incised and the abdomen was entered after incising the peritoneum. We verified no inadvertent injury or entry into the abdominal viscera. A 12mm port was placed at the left illiac fossa and left costovertebral angle. We then placed the GelPort and began insufflations. We then used the scissors and a sucker to dissect the colon medially, dissecting the line of Toldt, reflecting it off of the kidney. The left colon was reflected medially. Once this was accomplished, we freed up the lower pole, identified the ureter. The dissection of the kidney was then carried cephalad and the hilum was identified and skeletinized. The hilum was controlled with Endo VERONA 45 vascular stapler.   The lower pole renal vein branch was wejennifer clipped. Once this was completed, we cleared the adrenal off of the kidney and completely mobilize the kidney and the rest of the lateral and posterior attachments was dissected. The lower pole attachments including the ureter was ligated and divided with the EndoGIA. The penumoperitoneum was lowered to 5mmHg and hemostasis check and it was excellent. The kidney was then removed through our hand-assist port. We then closed all the port sites subcuticularly with 4-0 monocryl. We did take a look in at the renal hilum prior to final removal of the ports, and there was no evidence of any further bleeding. The large incision was closed  with 1-0 looped PDS. The subcutaneous tissue was approximated to minimize risk of seroma. The wound was irrigated and the skin closed subcuticularly. Experell was infiltrated into the muscles and fat for local anesthetics. The patient tolerated the procedure well. She was then extubated and returned back to the supine position where she was transferred to the PACU after being moved to the cart in stable condition.     Electronically Signed by Alessia Lazcano MD on 2/3/2021 at 10:17 AM

## 2021-02-03 NOTE — PROGRESS NOTES
1145  Pt is on unit 3S, rm 304.    1457  Pt is nauseous. Administer Zofran 4 mg.    1823  Pt states that she has left side sciatic pain. 1916  Bedside and verbal shift change report given by KOFI Yarbrough (off going nurse) to Suleiman Gentile RN(on coming nurse). Report included the following information SBAR, Kardex, OR Summary, Intake/Output and MAR.

## 2021-02-04 LAB
ANION GAP SERPL CALC-SCNC: 7 MMOL/L (ref 3–18)
BUN SERPL-MCNC: 27 MG/DL (ref 7–18)
BUN/CREAT SERPL: 19 (ref 12–20)
CALCIUM SERPL-MCNC: 8.5 MG/DL (ref 8.5–10.1)
CHLORIDE SERPL-SCNC: 102 MMOL/L (ref 100–111)
CO2 SERPL-SCNC: 26 MMOL/L (ref 21–32)
CREAT SERPL-MCNC: 1.43 MG/DL (ref 0.6–1.3)
ERYTHROCYTE [DISTWIDTH] IN BLOOD BY AUTOMATED COUNT: 13.1 % (ref 11.6–14.5)
GLUCOSE BLD STRIP.AUTO-MCNC: 131 MG/DL (ref 70–110)
GLUCOSE BLD STRIP.AUTO-MCNC: 137 MG/DL (ref 70–110)
GLUCOSE BLD STRIP.AUTO-MCNC: 141 MG/DL (ref 70–110)
GLUCOSE BLD STRIP.AUTO-MCNC: 161 MG/DL (ref 70–110)
GLUCOSE SERPL-MCNC: 117 MG/DL (ref 74–99)
HCT VFR BLD AUTO: 39.1 % (ref 35–45)
HGB BLD-MCNC: 13.4 G/DL (ref 12–16)
MCH RBC QN AUTO: 28.3 PG (ref 24–34)
MCHC RBC AUTO-ENTMCNC: 34.3 G/DL (ref 31–37)
MCV RBC AUTO: 82.5 FL (ref 74–97)
PLATELET # BLD AUTO: 324 K/UL (ref 135–420)
PMV BLD AUTO: 9 FL (ref 9.2–11.8)
POTASSIUM SERPL-SCNC: 4 MMOL/L (ref 3.5–5.5)
RBC # BLD AUTO: 4.74 M/UL (ref 4.2–5.3)
SODIUM SERPL-SCNC: 135 MMOL/L (ref 136–145)
WBC # BLD AUTO: 13.7 K/UL (ref 4.6–13.2)

## 2021-02-04 PROCEDURE — 74011636637 HC RX REV CODE- 636/637: Performed by: UROLOGY

## 2021-02-04 PROCEDURE — 77010033678 HC OXYGEN DAILY

## 2021-02-04 PROCEDURE — 74011000258 HC RX REV CODE- 258: Performed by: UROLOGY

## 2021-02-04 PROCEDURE — 74011250636 HC RX REV CODE- 250/636: Performed by: UROLOGY

## 2021-02-04 PROCEDURE — 85027 COMPLETE CBC AUTOMATED: CPT

## 2021-02-04 PROCEDURE — 82962 GLUCOSE BLOOD TEST: CPT

## 2021-02-04 PROCEDURE — 80048 BASIC METABOLIC PNL TOTAL CA: CPT

## 2021-02-04 PROCEDURE — 36415 COLL VENOUS BLD VENIPUNCTURE: CPT

## 2021-02-04 PROCEDURE — 74011000250 HC RX REV CODE- 250: Performed by: UROLOGY

## 2021-02-04 PROCEDURE — 74011250637 HC RX REV CODE- 250/637: Performed by: UROLOGY

## 2021-02-04 PROCEDURE — 65270000029 HC RM PRIVATE

## 2021-02-04 RX ORDER — OXYCODONE AND ACETAMINOPHEN 7.5; 325 MG/1; MG/1
1 TABLET ORAL
Qty: 20 TAB | Refills: 0 | Status: SHIPPED | OUTPATIENT
Start: 2021-02-04 | End: 2021-02-11

## 2021-02-04 RX ORDER — DOCUSATE SODIUM 100 MG/1
100 CAPSULE, LIQUID FILLED ORAL 2 TIMES DAILY
Qty: 30 CAP | Refills: 0 | Status: SHIPPED | OUTPATIENT
Start: 2021-02-04 | End: 2021-02-19

## 2021-02-04 RX ADMIN — SODIUM CHLORIDE 75 ML/HR: 450 INJECTION, SOLUTION INTRAVENOUS at 16:28

## 2021-02-04 RX ADMIN — HYDROMORPHONE HYDROCHLORIDE 1 MG: 1 INJECTION, SOLUTION INTRAMUSCULAR; INTRAVENOUS; SUBCUTANEOUS at 05:31

## 2021-02-04 RX ADMIN — SODIUM CHLORIDE 100 ML/HR: 450 INJECTION, SOLUTION INTRAVENOUS at 06:30

## 2021-02-04 RX ADMIN — INSULIN LISPRO 2 UNITS: 100 INJECTION, SOLUTION INTRAVENOUS; SUBCUTANEOUS at 12:47

## 2021-02-04 RX ADMIN — AMLODIPINE BESYLATE 10 MG: 5 TABLET ORAL at 09:37

## 2021-02-04 RX ADMIN — DOCUSATE SODIUM 100 MG: 100 CAPSULE, LIQUID FILLED ORAL at 21:48

## 2021-02-04 RX ADMIN — METOPROLOL TARTRATE 50 MG: 50 TABLET, FILM COATED ORAL at 09:42

## 2021-02-04 RX ADMIN — METOPROLOL TARTRATE 50 MG: 50 TABLET, FILM COATED ORAL at 10:27

## 2021-02-04 RX ADMIN — HYDROMORPHONE HYDROCHLORIDE 1 MG: 1 INJECTION, SOLUTION INTRAMUSCULAR; INTRAVENOUS; SUBCUTANEOUS at 16:27

## 2021-02-04 RX ADMIN — LORATADINE 10 MG: 10 TABLET ORAL at 15:19

## 2021-02-04 RX ADMIN — Medication 10 ML: at 06:28

## 2021-02-04 RX ADMIN — Medication 10 ML: at 21:48

## 2021-02-04 RX ADMIN — VALSARTAN 80 MG: 160 TABLET, FILM COATED ORAL at 15:19

## 2021-02-04 RX ADMIN — Medication 2 G: at 06:28

## 2021-02-04 RX ADMIN — OXYCODONE HYDROCHLORIDE AND ACETAMINOPHEN 1 TABLET: 7.5; 325 TABLET ORAL at 21:48

## 2021-02-04 RX ADMIN — HYDROMORPHONE HYDROCHLORIDE 1 MG: 1 INJECTION, SOLUTION INTRAMUSCULAR; INTRAVENOUS; SUBCUTANEOUS at 09:52

## 2021-02-04 RX ADMIN — METOPROLOL TARTRATE 100 MG: 50 TABLET, FILM COATED ORAL at 21:47

## 2021-02-04 RX ADMIN — Medication 10 ML: at 15:26

## 2021-02-04 RX ADMIN — DOCUSATE SODIUM 100 MG: 100 CAPSULE, LIQUID FILLED ORAL at 09:42

## 2021-02-04 RX ADMIN — HEPARIN SODIUM 5000 UNITS: 5000 INJECTION INTRAVENOUS; SUBCUTANEOUS at 12:47

## 2021-02-04 RX ADMIN — TRIAMTERENE AND HYDROCHLOROTHIAZIDE 1 TABLET: 75; 50 TABLET ORAL at 15:20

## 2021-02-04 NOTE — ROUTINE PROCESS
1916 Bedside and Verbal shift change report given to Kye Rodrigues RN (oncoming nurse) by Abdirizak Blanc RN (offgoing nurse). Report included the following information SBAR, Kardex, Intake/Output, MAR and Recent Results. 2126 Pt C/O 6/10 abd pain unrelieved by repositioning, PRN administered will follow up on effectiveness. Trocar to mid upper abd and left lower abd and midline secured w/dermabond and CDI. Midline incision has some bruising noted on both sides. Hernandez patent and draining clear,yellow urine with no odor. 0002 Pt states pain has subsided. 0715 Bedside and Verbal shift change report given to SHAILESH Winters RN (oncoming nurse) by Kye Rodrigues RN (offgoing nurse). Report included the following information SBAR, Kardex, Intake/Output, MAR and Recent Results.

## 2021-02-04 NOTE — PROGRESS NOTES
Urology Progress Note    Subjective:     Daily Progress Note: 2021 7:14 AM    Preeti Pastor is doing ok. Pain moderately controlled. NO fever or chills or sob, nv.     Objective:     Visit Vitals  /75 (BP 1 Location: Left upper arm, BP Patient Position: At rest)   Pulse 74   Temp 98.3 °F (36.8 °C)   Resp 18   Ht 5' 3\" (1.6 m)   Wt 115.2 kg (254 lb)   SpO2 93%   BMI 44.99 kg/m²        Temp (24hrs), Av.7 °F (36.5 °C), Min:97.3 °F (36.3 °C), Max:98.3 °F (36.8 °C)      Intake and Output:   190 -  0700  In: 9377 [P. O.:60; I.V.:3242]  Out:  [Urine:1930]  No intake/output data recorded. Physical Exam:   NAD, breathing well    Incision: clean, dry    Lab/Data Review:  BMP:   Lab Results   Component Value Date/Time     (L) 2021 12:00 AM    K 4.0 2021 12:00 AM     2021 12:00 AM    CO2 26 2021 12:00 AM    AGAP 7 2021 12:00 AM     (H) 2021 12:00 AM    BUN 27 (H) 2021 12:00 AM    CREA 1.43 (H) 2021 12:00 AM    GFRAA 49 (L) 2021 12:00 AM    GFRNA 40 (L) 2021 12:00 AM     CMP:   Lab Results   Component Value Date/Time     (L) 2021 12:00 AM    K 4.0 2021 12:00 AM     2021 12:00 AM    CO2 26 2021 12:00 AM    AGAP 7 2021 12:00 AM     (H) 2021 12:00 AM    BUN 27 (H) 2021 12:00 AM    CREA 1.43 (H) 2021 12:00 AM    GFRAA 49 (L) 2021 12:00 AM    GFRNA 40 (L) 2021 12:00 AM    CA 8.5 2021 12:00 AM     CBC:   Lab Results   Component Value Date/Time    WBC 13.7 (H) 2021 12:00 AM    HGB 13.4 2021 12:00 AM    HCT 39.1 2021 12:00 AM     2021 12:00 AM       Assessment/Plan:     Active Problems:    Left renal mass (2/3/2021)    POD 1 Left hand assisted radical nephrectomy.     Plan:    Dc conroy catheter  OOB ambulating QID  DM diet  Plan for home tomorrow

## 2021-02-04 NOTE — PROGRESS NOTES
S: Conroy catheter removed. B: Dr Landaverde presented on unit during am rounds and informed nursing staff that order would be inputted for patient to have conroy catheter removed. Understanding was verbalized and patient was notified about conroy catheter removal. Patient presented with 250 cc of yellow, clear urine via conroy catheter prior to removal.   A: At 0840 am, patient's conroy catheter was removed as directed without difficulty or complications. Patient was informed to void via hat in toilet post conroy catheter removal for proper tracking of urinary output. Patient verbalized understanding. R: will continue with prescribed plan of care as directed.    Kevin Mendosa  2/4/2021  8:53 AM

## 2021-02-04 NOTE — PROGRESS NOTES
Problem: Falls - Risk of  Goal: *Absence of Falls  Description: Document Abdifatah Toney Fall Risk and appropriate interventions in the flowsheet. Outcome: Progressing Towards Goal  Note: Fall Risk Interventions:  Mobility Interventions: Assess mobility with egress test, Communicate number of staff needed for ambulation/transfer, Patient to call before getting OOB         Medication Interventions: Patient to call before getting OOB, Teach patient to arise slowly    Elimination Interventions: Call light in reach, Patient to call for help with toileting needs    History of Falls Interventions:  Investigate reason for fall

## 2021-02-04 NOTE — DIABETES MGMT
GLYCEMIC CONTROL PROGRESS NOTE:    - known h/o T2DM, HbA1C within recommended range for age + comorbids oral home regimen  - s/p Urologic surgery, diet advanced to consistent carb today  - BG out of target range no-ICU: < 180 mg/dL  - TDD = 5 units corrective coverage  - recommend monitor BG trends today with PO intake  - pt might benefit from scheduled dose of insulin if BG trends up    Recent Glucose Results:   Lab Results   Component Value Date/Time     (H) 02/04/2021 12:00 AM    GLUCPOC 141 (H) 02/04/2021 07:21 AM    GLUCPOC 131 (H) 02/03/2021 09:19 PM    GLUCPOC 181 (H) 02/03/2021 03:47 PM       Conception Mu MS, RN, CDE  Glycemic Control Team  [phone]  Pager [phone] (M-TH 8:00-4:30P)  *After Hours pager [phone]

## 2021-02-05 VITALS
RESPIRATION RATE: 16 BRPM | DIASTOLIC BLOOD PRESSURE: 76 MMHG | BODY MASS INDEX: 46.69 KG/M2 | OXYGEN SATURATION: 95 % | TEMPERATURE: 98.2 F | HEIGHT: 63 IN | HEART RATE: 69 BPM | WEIGHT: 263.5 LBS | SYSTOLIC BLOOD PRESSURE: 114 MMHG

## 2021-02-05 LAB
ANION GAP SERPL CALC-SCNC: 9 MMOL/L (ref 3–18)
BUN SERPL-MCNC: 20 MG/DL (ref 7–18)
BUN/CREAT SERPL: 15 (ref 12–20)
CALCIUM SERPL-MCNC: 9.1 MG/DL (ref 8.5–10.1)
CHLORIDE SERPL-SCNC: 99 MMOL/L (ref 100–111)
CO2 SERPL-SCNC: 25 MMOL/L (ref 21–32)
CREAT SERPL-MCNC: 1.37 MG/DL (ref 0.6–1.3)
GLUCOSE BLD STRIP.AUTO-MCNC: 143 MG/DL (ref 70–110)
GLUCOSE BLD STRIP.AUTO-MCNC: 145 MG/DL (ref 70–110)
GLUCOSE SERPL-MCNC: 114 MG/DL (ref 74–99)
POTASSIUM SERPL-SCNC: 3.7 MMOL/L (ref 3.5–5.5)
SODIUM SERPL-SCNC: 133 MMOL/L (ref 136–145)

## 2021-02-05 PROCEDURE — 74011250637 HC RX REV CODE- 250/637: Performed by: UROLOGY

## 2021-02-05 PROCEDURE — 80048 BASIC METABOLIC PNL TOTAL CA: CPT

## 2021-02-05 PROCEDURE — 74011250636 HC RX REV CODE- 250/636: Performed by: UROLOGY

## 2021-02-05 PROCEDURE — 36415 COLL VENOUS BLD VENIPUNCTURE: CPT

## 2021-02-05 PROCEDURE — 82962 GLUCOSE BLOOD TEST: CPT

## 2021-02-05 RX ADMIN — HYDROMORPHONE HYDROCHLORIDE 1 MG: 1 INJECTION, SOLUTION INTRAMUSCULAR; INTRAVENOUS; SUBCUTANEOUS at 00:43

## 2021-02-05 RX ADMIN — AMLODIPINE BESYLATE 10 MG: 5 TABLET ORAL at 09:59

## 2021-02-05 RX ADMIN — METOPROLOL TARTRATE 100 MG: 50 TABLET, FILM COATED ORAL at 09:59

## 2021-02-05 RX ADMIN — HEPARIN SODIUM 5000 UNITS: 5000 INJECTION INTRAVENOUS; SUBCUTANEOUS at 00:41

## 2021-02-05 RX ADMIN — Medication 10 ML: at 07:09

## 2021-02-05 RX ADMIN — DOCUSATE SODIUM 100 MG: 100 CAPSULE, LIQUID FILLED ORAL at 09:59

## 2021-02-05 NOTE — DISCHARGE SUMMARY
Discharge Summary     Patient: Omari Roche MRN: [id number]  SSN: [id number]    YOB: 1980  Age: 41 y.o. Sex: female      Allergies: Lisinopril and Tape [adhesive]    Admit Date: 2/3/2021    Discharge Date: 2/5/2021     * Admission Diagnoses:  Left renal mass [N28.89]     * Discharge Diagnoses:   Hospital Problems as of 2/5/2021 Date Reviewed: 2/3/2021          Codes Class Noted - Resolved POA    Left renal mass ICD-10-CM: N28.89  ICD-9-CM: 593.9  2/3/2021 - Present Unknown               * Procedures for this admission:   Procedure(s):  LEFT HAND ASSISTED LAPAROSCOPIC RADICAL NEPHRECTOMY      * Disposition: Home    * Discharged Condition: good    * Hospital Course:    Unremarkable. Patient tolerated diet and voided after cornoy catheter removed. Pain controlled. Discharge Medications:   Current Discharge Medication List      START taking these medications    Details   docusate sodium (COLACE) 100 mg capsule Take 1 Cap by mouth two (2) times a day for 15 days. Qty: 30 Cap, Refills: 0      oxyCODONE-acetaminophen (PERCOCET 7.5) 7.5-325 mg per tablet Take 1 Tab by mouth every four (4) hours as needed for Pain for up to 7 days. Max Daily Amount: 6 Tabs. Qty: 20 Tab, Refills: 0    Associated Diagnoses: Left renal mass         CONTINUE these medications which have NOT CHANGED    Details   metoprolol tartrate (LOPRESSOR) 100 mg IR tablet Take 100 mg by mouth two (2) times a day. triamterene-hydroCHLOROthiazide (MAXZIDE) 75-50 mg per tablet Take 1 Tab by mouth daily. valsartan (DIOVAN) 80 mg tablet Take 80 mg by mouth daily. metFORMIN (GLUCOPHAGE) 500 mg tablet Take 1,000 mg by mouth daily (with dinner). amLODIPine (NORVASC) 10 mg tablet Take 10 mg by mouth daily. loratadine (CLARITIN) 10 mg tablet Take 10 mg by mouth daily. fluticasone propionate (FLOVENT DISKUS) 50 mcg/actuation inhaler Take  by inhalation nightly.       LORazepam (ATIVAN) 2 mg tablet Take 1 mg by mouth two (2) times daily as needed for Anxiety. 1m- 2mg as needed BID      ergocalciferol (VITAMIN D2) 50,000 unit capsule Take 50,000 Units by mouth every seven (7) days. Monday's      baclofen (LIORESAL) 10 mg tablet Take 0.5 Tabs by mouth two (2) times daily as needed. Qty: 14 Tab, Refills: 0              * Follow-up Care/Patient Instructions: Activity: Activity as tolerated  Diet: Diabetic Diet  Wound Care: Keep wound clean and dry    Follow-up Information     Follow up With Specialties Details Why Contact Info    Zhanna Hinkle MD Family Medicine   [address] [address] [phone]      Kera Acosta MD Urology  MD's office will contact patient with f/u appointment date/time.  [address]  [phone]

## 2021-02-05 NOTE — PROGRESS NOTES
Bedside and Verbal shift change report given to Adalberto Barron RN  (oncoming nurse) by Dennys SANDHU, RN (offgoing nurse). Report included the following information SBAR, Kardex and MAR. SHIFT UPDATES:  Dr. Barbara Guan presented to unit this am and assessed patient and informed nursing staff that orders would be inputted for patient's conroy catheter to be removed and for patient's diet to be advanced from clear liquid diet to Diabetic diet. Additionally, he verbalized that patient should walk at least 3-4 times around unit. Patient tolerated Diabetic Diet without difficulty and ambulated around unit after meal periods at least 10 feet. Patient's conroy catheter was removed around 840 am and patient voided at least 400 cc yellow, clear urine via hat in toilet at 1358 pm. Patient's IV fluid therapy rate was adjusted from Normal Saline 0.45% from 100 cc/hr to 75 cc/hr. Patient receives IV dilaudid 1 mg every 3 hours PRN severe pain and last received medication around 952 am. Patient presents AC/HS and receives insulin lispro sliding scale for blood sugar measurements greater than 149. ABNORMAL LAB:   Results for Griffith Seip (MRN [id number]) as of 2/4/2021 15:30   Ref.  Range 2/4/2021 00:00   WBC Latest Ref Range: 4.6 - 13.2 K/uL 13.7 (H)   RBC Latest Ref Range: 4.20 - 5.30 M/uL 4.74   HGB Latest Ref Range: 12.0 - 16.0 g/dL 13.4   HCT Latest Ref Range: 35.0 - 45.0 % 39.1   MCV Latest Ref Range: 74.0 - 97.0 FL 82.5   MCH Latest Ref Range: 24.0 - 34.0 PG 28.3   MCHC Latest Ref Range: 31.0 - 37.0 g/dL 34.3   RDW Latest Ref Range: 11.6 - 14.5 % 13.1   PLATELET Latest Ref Range: 135 - 420 K/uL 324   MPV Latest Ref Range: 9.2 - 11.8 FL 9.0 (L)   Sodium Latest Ref Range: 136 - 145 mmol/L 135 (L)   Potassium Latest Ref Range: 3.5 - 5.5 mmol/L 4.0   Chloride Latest Ref Range: 100 - 111 mmol/L 102   CO2 Latest Ref Range: 21 - 32 mmol/L 26   Anion gap Latest Ref Range: 3.0 - 18 mmol/L 7   Glucose Latest Ref Range: 74 - 99 mg/dL 117 (H)   BUN Latest Ref Range: 7.0 - 18 MG/DL 27 (H)   Creatinine Latest Ref Range: 0.6 - 1.3 MG/DL 1.43 (H)   BUN/Creatinine ratio Latest Ref Range: 12 - 20   19   Calcium Latest Ref Range: 8.5 - 10.1 MG/DL 8.5   GFR est non-AA Latest Ref Range: >60 ml/min/1.73m2 40 (L)   GFR est AA Latest Ref Range: >60 ml/min/1.73m2 49 (L)     Wounds? 2 Abdominal Trocar Sites (Open to Air. Managed by Surgery). Central Lines? None. Last BM:  2/3/2021      Pending Labs for AM Draw: On 2/5/2021 at 4 am BMP level. Discharge plan:  As of 2/3/2021 at 1514 pm case management note on file states that patient will discharge home with family assistance once medically stable.        Kevin Mendosa  2/4/2021  7:01 PM

## 2021-02-05 NOTE — PROGRESS NOTES
1915 - Bedside and Verbal shift change report given to SHAILESH Mendosa RN (oncoming nurse) by Sadiq Sloan RN (offgoing nurse). Report included the following information SBAR, Kardex, Intake/Output, MAR and Recent Results. Shift summary -- Pt reported sharp pain to lower back r/t history of sciatic nerve pain as well as abdominal, surgical related pain. PRN medications given, which provided comfort. Tolerated diet free of nausea. Voided yellow/clear urine without difficulty. Ambulated hallway x 1 but denies passing flatus. BS hypoactive. 0730 - Bedside and Verbal shift change report given to SHAILESH Mendosa RN (oncoming nurse) by Sadiq Sloan RN (offgoing nurse). Report included the following information SBAR, Kardex, Intake/Output, MAR and Recent Results.

## 2021-02-05 NOTE — PROGRESS NOTES
02/05/21 0952   Vital Signs   Temp 98.9 °F (37.2 °C)   Temp Source Oral   Pulse (Heart Rate) 69   Heart Rate Source Monitor   Resp Rate 16   O2 Sat (%) 100 %   Level of Consciousness Alert   /62   MAP (Calculated) 76   BP 1 Method Automatic   BP 1 Location Left upper arm   BP Patient Position At rest   MEWS Score 1   Pain 1   Pain Scale 1 Numeric (0 - 10)   Pain Intensity 1 0   Patient Stated Pain Goal 0   S: Patient discharge completed. B: Patient presented with prospective discharge orders from facility via DR. Landaverde. Patient was informed of discharge and verbalized understanding. Patient was assisted with dressing herself and gathering patient belongings prior to discharge from facility. Patient's 18 gauge left hand IV and 20 gauge right forearm IV was removed prior to discharge from facility. A: Patient presented awake, alert and responsive with the most recent vital signs listed above. Patient was provided discharge education, instructions and notified about follow up appointments and discharge prescriptions. Patient verbalized understanding. Patient was informed to notify nursing staff once her discharge ride presented at front entrance to notify nursing staff so that she could be escorted to front entrance via wheelchair. Patient verbalized understanding. R: Will continue with prescribed plan of care.   Kevin Mendosa  2/5/2021  9:53 am

## 2022-03-19 PROBLEM — N28.89 LEFT RENAL MASS: Status: ACTIVE | Noted: 2021-02-03

## 2022-06-22 ENCOUNTER — HOSPITAL ENCOUNTER (EMERGENCY)
Age: 42
Discharge: HOME | End: 2022-06-23
Attending: EMERGENCY MEDICINE
Payer: COMMERCIAL

## 2022-06-22 VITALS
HEART RATE: 88 BPM | TEMPERATURE: 97.1 F | WEIGHT: 272 LBS | OXYGEN SATURATION: 97 % | RESPIRATION RATE: 16 BRPM | BODY MASS INDEX: 48.2 KG/M2 | DIASTOLIC BLOOD PRESSURE: 96 MMHG | HEIGHT: 63 IN | SYSTOLIC BLOOD PRESSURE: 152 MMHG

## 2022-06-22 DIAGNOSIS — N17.9 AKI (ACUTE KIDNEY INJURY) (HCC): Primary | ICD-10-CM

## 2022-06-22 DIAGNOSIS — R73.9 HYPERGLYCEMIA: ICD-10-CM

## 2022-06-22 LAB
ALBUMIN SERPL-MCNC: 3.7 G/DL (ref 3.4–5)
ALBUMIN/GLOB SERPL: 0.9 {RATIO} (ref 0.8–1.7)
ALP SERPL-CCNC: 150 U/L (ref 45–117)
ALT SERPL-CCNC: 175 U/L (ref 13–56)
ANION GAP SERPL CALC-SCNC: 9 MMOL/L (ref 3–18)
APPEARANCE UR: CLEAR
AST SERPL-CCNC: 101 U/L (ref 10–38)
BACTERIA URNS QL MICRO: ABNORMAL /HPF
BASOPHILS # BLD: 0.1 K/UL (ref 0–0.1)
BASOPHILS NFR BLD: 1 % (ref 0–2)
BILIRUB SERPL-MCNC: 0.3 MG/DL (ref 0.2–1)
BILIRUB UR QL: NEGATIVE
BUN SERPL-MCNC: 34 MG/DL (ref 7–18)
BUN/CREAT SERPL: 16 (ref 12–20)
CALCIUM SERPL-MCNC: 9.5 MG/DL (ref 8.5–10.1)
CHLORIDE SERPL-SCNC: 91 MMOL/L (ref 100–111)
CO2 SERPL-SCNC: 27 MMOL/L (ref 21–32)
COLOR UR: YELLOW
CREAT SERPL-MCNC: 2.07 MG/DL (ref 0.6–1.3)
DIFFERENTIAL METHOD BLD: ABNORMAL
EOSINOPHIL # BLD: 0.2 K/UL (ref 0–0.4)
EOSINOPHIL NFR BLD: 3 % (ref 0–5)
EPITH CASTS URNS QL MICRO: ABNORMAL /LPF (ref 0–5)
ERYTHROCYTE [DISTWIDTH] IN BLOOD BY AUTOMATED COUNT: 12.9 % (ref 11.6–14.5)
GLOBULIN SER CALC-MCNC: 3.9 G/DL (ref 2–4)
GLUCOSE BLD STRIP.AUTO-MCNC: 557 MG/DL (ref 70–110)
GLUCOSE SERPL-MCNC: 559 MG/DL (ref 74–99)
GLUCOSE UR STRIP.AUTO-MCNC: >1000 MG/DL
HCT VFR BLD AUTO: 44.3 % (ref 35–45)
HGB BLD-MCNC: 15.5 G/DL (ref 12–16)
HGB UR QL STRIP: ABNORMAL
IMM GRANULOCYTES # BLD AUTO: 0 K/UL (ref 0–0.04)
IMM GRANULOCYTES NFR BLD AUTO: 1 % (ref 0–0.5)
KETONES UR QL STRIP.AUTO: NEGATIVE MG/DL
LEUKOCYTE ESTERASE UR QL STRIP.AUTO: NEGATIVE
LYMPHOCYTES # BLD: 2.6 K/UL (ref 0.9–3.6)
LYMPHOCYTES NFR BLD: 33 % (ref 21–52)
MCH RBC QN AUTO: 27.8 PG (ref 24–34)
MCHC RBC AUTO-ENTMCNC: 35 G/DL (ref 31–37)
MCV RBC AUTO: 79.4 FL (ref 78–100)
MONOCYTES # BLD: 0.4 K/UL (ref 0.05–1.2)
MONOCYTES NFR BLD: 6 % (ref 3–10)
NEUTS SEG # BLD: 4.4 K/UL (ref 1.8–8)
NEUTS SEG NFR BLD: 56 % (ref 40–73)
NITRITE UR QL STRIP.AUTO: NEGATIVE
NRBC # BLD: 0 K/UL (ref 0–0.01)
NRBC BLD-RTO: 0 PER 100 WBC
PH UR STRIP: 5.5 [PH] (ref 5–8)
PLATELET # BLD AUTO: 313 K/UL (ref 135–420)
PMV BLD AUTO: 10 FL (ref 9.2–11.8)
POTASSIUM SERPL-SCNC: 4.3 MMOL/L (ref 3.5–5.5)
PROT SERPL-MCNC: 7.6 G/DL (ref 6.4–8.2)
PROT UR STRIP-MCNC: NEGATIVE MG/DL
RBC # BLD AUTO: 5.58 M/UL (ref 4.2–5.3)
RBC #/AREA URNS HPF: ABNORMAL /HPF (ref 0–5)
SODIUM SERPL-SCNC: 127 MMOL/L (ref 136–145)
SP GR UR REFRACTOMETRY: 1.02 (ref 1–1.03)
UROBILINOGEN UR QL STRIP.AUTO: 0.2 EU/DL (ref 0.2–1)
WBC # BLD AUTO: 7.7 K/UL (ref 4.6–13.2)
WBC URNS QL MICRO: ABNORMAL /HPF (ref 0–5)
YEAST URNS QL MICRO: ABNORMAL

## 2022-06-22 PROCEDURE — 74011636637 HC RX REV CODE- 636/637: Performed by: EMERGENCY MEDICINE

## 2022-06-22 PROCEDURE — 74011250636 HC RX REV CODE- 250/636: Performed by: EMERGENCY MEDICINE

## 2022-06-22 PROCEDURE — 96374 THER/PROPH/DIAG INJ IV PUSH: CPT

## 2022-06-22 PROCEDURE — 99284 EMERGENCY DEPT VISIT MOD MDM: CPT

## 2022-06-22 PROCEDURE — 82962 GLUCOSE BLOOD TEST: CPT

## 2022-06-22 PROCEDURE — 96361 HYDRATE IV INFUSION ADD-ON: CPT

## 2022-06-22 PROCEDURE — 85025 COMPLETE CBC W/AUTO DIFF WBC: CPT

## 2022-06-22 PROCEDURE — 80053 COMPREHEN METABOLIC PANEL: CPT

## 2022-06-22 PROCEDURE — 81001 URINALYSIS AUTO W/SCOPE: CPT

## 2022-06-22 RX ADMIN — INSULIN HUMAN 10 UNITS: 100 INJECTION, SOLUTION PARENTERAL at 23:46

## 2022-06-22 RX ADMIN — SODIUM CHLORIDE 1000 ML: 9 INJECTION, SOLUTION INTRAVENOUS at 23:46

## 2022-06-22 RX ADMIN — SODIUM CHLORIDE 1000 ML: 9 INJECTION, SOLUTION INTRAVENOUS at 22:13

## 2022-06-23 LAB
GLUCOSE BLD STRIP.AUTO-MCNC: 369 MG/DL (ref 70–110)
GLUCOSE BLD STRIP.AUTO-MCNC: 381 MG/DL (ref 70–110)
GLUCOSE BLD STRIP.AUTO-MCNC: 425 MG/DL (ref 70–110)

## 2022-06-23 PROCEDURE — 82962 GLUCOSE BLOOD TEST: CPT

## 2022-06-23 PROCEDURE — 74011250637 HC RX REV CODE- 250/637: Performed by: EMERGENCY MEDICINE

## 2022-06-23 RX ORDER — ONDANSETRON 4 MG/1
4 TABLET, ORALLY DISINTEGRATING ORAL
Status: COMPLETED | OUTPATIENT
Start: 2022-06-23 | End: 2022-06-23

## 2022-06-23 RX ADMIN — ONDANSETRON 4 MG: 4 TABLET, ORALLY DISINTEGRATING ORAL at 01:23

## 2022-06-23 NOTE — DISCHARGE INSTRUCTIONS
Back if you get worse. Follow-up without fail. Drink plenty of fluids.   Make sure you see a nephrologist.

## 2022-06-23 NOTE — ED PROVIDER NOTES
75-year-old female past medical history of anxiety asthma diabetes type 2 hypertension liver disease and kidney disease presents to the emergency department with hyperglycemia. Patient stated that her doctor had her double up her metformin but the prescription was never adjusted. Patient ran out of metformin earlier this week. She was able to get the prescription however her glucose was high at home. Patient has been having frequent urination. Also reports having a yeast infection which she treated at home. Patient has no polyuria polyphagia. Patient has no fevers or chills. No chest pain or shortness of breath. No other issues expressed. Past Medical History:   Diagnosis Date    Anxiety     Asthma     no inhaler    Diabetes ([address] 2020    Type II    H/O seasonal allergies     Hypertension     Left kidney mass 2020    Liver disease     Non Alcoholic fatty liver       Past Surgical History:   Procedure Laterality Date    HX CHOLECYSTECTOMY  2013    HX RHINOPLASTY      sinus     HX SEPTOPLASTY           History reviewed. No pertinent family history.     Social History     Socioeconomic History    Marital status: SINGLE     Spouse name: Not on file    Number of children: Not on file    Years of education: Not on file    Highest education level: Not on file   Occupational History    Not on file   Tobacco Use    Smoking status: Former Smoker     Packs/day: 0.25    Smokeless tobacco: Never Used    Tobacco comment: 2021   Vaping Use    Vaping Use: Never used   Substance and Sexual Activity    Alcohol use: No    Drug use: No    Sexual activity: Not on file   Other Topics Concern    Not on file   Social History Narrative    Not on file     Social Determinants of Health     Financial Resource Strain:     Difficulty of Paying Living Expenses: Not on file   Food Insecurity:     Worried About [address] in the Last Year: Not on file    [address] in the Last Year: Not on file   Transportation Needs:     Lack of Transportation (Medical): Not on file    Lack of Transportation (Non-Medical): Not on file   Physical Activity:     Days of Exercise per Week: Not on file    Minutes of Exercise per Session: Not on file   Stress:     Feeling of Stress : Not on file   Social Connections:     Frequency of Communication with Friends and Family: Not on file    Frequency of Social Gatherings with Friends and Family: Not on file    Attends [employer] Services: Not on file    Active Member of [address] [place] [address] allyve or Organizations: Not on file    Attends Club or Organization Meetings: Not on file    Marital Status: Not on file   Intimate Partner Violence:     Fear of Current or Ex-Partner: Not on file    Emotionally Abused: Not on file    Physically Abused: Not on file    Sexually Abused: Not on file   Housing Stability:     Unable to Pay for Housing in the Last Year: Not on file    Number of [place] in the Last Year: Not on file    Unstable Housing in the Last Year: Not on file         ALLERGIES: Lisinopril and Tape [adhesive]    Review of Systems   Constitutional: Negative. HENT: Negative. Respiratory: Negative. Cardiovascular: Negative. Gastrointestinal: Negative. Genitourinary: Negative. Musculoskeletal: Negative. Hematological: Negative. All other systems reviewed and are negative. Vitals:    06/22/22 2138 06/22/22 2157   BP: (!) 165/107 (!) 152/96   Pulse: 80 88   Resp: 18 16   Temp: 97.1 °F (36.2 °C)    SpO2: 97%    Weight: 123.4 kg (272 lb)    Height: 5' 3\" (1.6 m)             Physical Exam  Vitals and nursing note reviewed. Constitutional:       General: She is not in acute distress. Appearance: Normal appearance. She is not ill-appearing, toxic-appearing or diaphoretic. HENT:      Head: Normocephalic and atraumatic. Nose: Nose normal.   Eyes:      Extraocular Movements: Extraocular movements intact.    Cardiovascular:      Rate and Rhythm: Normal rate and regular rhythm. Pulmonary:      Effort: Pulmonary effort is normal.      Breath sounds: Normal breath sounds. Abdominal:      General: There is no distension. Palpations: Abdomen is soft. There is no mass. Tenderness: There is no abdominal tenderness. There is no right CVA tenderness, left CVA tenderness, guarding or rebound. Hernia: No hernia is present. Musculoskeletal:         General: Normal range of motion. Cervical back: Normal range of motion and neck supple. Skin:     General: Skin is warm. Capillary Refill: Capillary refill takes less than 2 seconds. Coloration: Skin is not jaundiced or pale. Findings: No bruising, erythema, lesion or rash. Neurological:      General: No focal deficit present. Mental Status: She is alert and oriented to person, place, and time. Psychiatric:         Mood and Affect: Mood normal.         Behavior: Behavior normal.          MDM  Number of Diagnoses or Management Options  Diagnosis management comments: 24-year-old female comes in for hyperglycemia. It appears the patient did not have proper doses of her medications and ran out. She has a new prescription. She feels better. Glucose is below 380. Her creatinine is 2.07 I spoke to the patient about her numbers and importance of follow-up. I do not think patient needs to stay in the hospital she received 2 L of fluid. She may be dehydrated secondary to hyperglycemia. I will not repeat the BMP but I will have her follow-up with nephrology on-call. Or she can go to her PCP.     ED Course as of 06/23/22 0108   Wed Jun 22, 2022   2326 Glucose(!!): 559 [MI]      ED Course User Index  [MI] Katie Garcia MD       Procedures

## 2022-06-23 NOTE — ED TRIAGE NOTES
Pt arrives ambulatory to ER w c/o high blood sugar (home meter reading HIGH). Pt states she has been out of her metformin and victoza for 2-3 days.

## 2022-06-23 NOTE — ED TRIAGE NOTES
BGL in triage 557. Pt states she was able to have her meds filled today and resumed them this evening.

## 2024-07-05 VITALS
TEMPERATURE: 97 F | RESPIRATION RATE: 18 BRPM | HEIGHT: 63 IN | SYSTOLIC BLOOD PRESSURE: 172 MMHG | DIASTOLIC BLOOD PRESSURE: 103 MMHG | OXYGEN SATURATION: 95 % | BODY MASS INDEX: 48.2 KG/M2 | WEIGHT: 272 LBS | HEART RATE: 87 BPM

## 2024-07-05 PROCEDURE — 99284 EMERGENCY DEPT VISIT MOD MDM: CPT

## 2024-07-05 ASSESSMENT — PAIN SCALES - GENERAL: PAINLEVEL_OUTOF10: 3

## 2024-07-06 ENCOUNTER — HOSPITAL ENCOUNTER (EMERGENCY)
Facility: HOSPITAL | Age: 44
Discharge: HOME | End: 2024-07-06
Attending: EMERGENCY MEDICINE
Payer: COMMERCIAL

## 2024-07-06 DIAGNOSIS — S61.312A LACERATION OF RIGHT MIDDLE FINGER WITHOUT FOREIGN BODY WITH DAMAGE TO NAIL, INITIAL ENCOUNTER: Primary | ICD-10-CM

## 2024-07-06 DIAGNOSIS — T14.8XXA SKIN AVULSION: ICD-10-CM

## 2024-07-06 PROCEDURE — 90471 IMMUNIZATION ADMIN: CPT | Performed by: EMERGENCY MEDICINE

## 2024-07-06 PROCEDURE — 90714 TD VACC NO PRESV 7 YRS+ IM: CPT | Performed by: EMERGENCY MEDICINE

## 2024-07-06 PROCEDURE — 6370000000 HC RX 637 (ALT 250 FOR IP): Performed by: EMERGENCY MEDICINE

## 2024-07-06 PROCEDURE — 6360000002 HC RX W HCPCS: Performed by: EMERGENCY MEDICINE

## 2024-07-06 RX ORDER — GINSENG 100 MG
CAPSULE ORAL
Status: COMPLETED | OUTPATIENT
Start: 2024-07-06 | End: 2024-07-06

## 2024-07-06 RX ORDER — TETANUS AND DIPHTHERIA TOXOIDS ADSORBED 2; 2 [LF]/.5ML; [LF]/.5ML
0.5 INJECTION INTRAMUSCULAR ONCE
Status: COMPLETED | OUTPATIENT
Start: 2024-07-06 | End: 2024-07-06

## 2024-07-06 RX ADMIN — BACITRACIN: 500 OINTMENT TOPICAL at 02:04

## 2024-07-06 RX ADMIN — TETANUS AND DIPHTHERIA TOXOIDS ADSORBED 0.5 ML: 2; 2 INJECTION INTRAMUSCULAR at 02:04

## 2024-07-06 NOTE — ED PROVIDER NOTES
EMERGENCY DEPARTMENT HISTORY AND PHYSICAL EXAM      Date: 7/6/2024  Patient Name: Aleksandra Sanford      History of Presenting Illness     Chief Complaint   Patient presents with    Laceration     Pt presents c/o cutting off tip of right middle finger. Pt was chopping veggies on a mandolin and cut it about 20 min ago. Bleeding controlled in triage. Pt unsure of tetanus status.          Location/Duration/Severity/Modifying factors   Chief Complaint   Patient presents with    Laceration     Pt presents c/o cutting off tip of right middle finger. Pt was chopping veggies on a mandolin and cut it about 20 min ago. Bleeding controlled in triage. Pt unsure of tetanus status.          HPI:  Aleksandra Sanford is a 43 y.o. female with PMH significant for hypertension, asthma and other comorbidities presents with laceration to the distal right middle finger after cutting her finger with a knife while cutting vegetables at home.  Was unable to control the bleeding.  Bleeding is since been controlled with pressure using a gauze.  Unsure when she last received a tetanus vaccine.    PCP: Rufus Escobedo MD    No current facility-administered medications for this encounter.     Current Outpatient Medications   Medication Sig Dispense Refill    amLODIPine (NORVASC) 10 MG tablet Take 10 mg by mouth daily      baclofen (LIORESAL) 10 MG tablet Take 5 mg by mouth 2 times daily as needed      ergocalciferol (ERGOCALCIFEROL) 1.25 MG (31205 UT) capsule Take 50,000 Units by mouth every 7 days      Fluticasone Propionate, Inhal, 50 MCG/ACT AEPB Inhale into the lungs      loratadine (CLARITIN) 10 MG tablet Take 10 mg by mouth daily      LORazepam (ATIVAN) 2 MG tablet Take 1 mg by mouth 2 times daily as needed.      metFORMIN (GLUCOPHAGE) 500 MG tablet Take 1,000 mg by mouth Daily with supper      metoprolol (LOPRESSOR) 100 MG tablet Take 100 mg by mouth 2 times daily      triamterene-hydroCHLOROthiazide (MAXZIDE) 75-50 MG per tablet Take 1        Meds Given in ED:  Medications   diptheria-tetanus toxoids (TDVAX) 2-2 LF/0.5ML injection 0.5 mL (0.5 mLs IntraMUSCular Given 7/6/24 0204)   bacitracin ointment ( Topical Given 7/6/24 0204)       Final Diagnosis:  1. Laceration of right middle finger without foreign body with damage to nail, initial encounter    2. Skin avulsion        Disposition:  Destination: Discharge    Discharge Rx:   New Prescriptions    No medications on file         Dictation disclaimer: Please note that this dictation was completed with [employer], the [employer] voice recognition software. Quite often unanticipated grammatical, syntax, homophones, and other interpretive errors are inadvertently transcribed by the computer software. Please disregard these errors. Please excuse any errors that have escaped final proofreading.     Roldan Blake D.O.  Emergency Physician   [facility]             Roldan Blake DO  07/06/24 0209

## 2024-07-06 NOTE — ED NOTES
Pt presents c/o cutting off tip of right middle finger. Pt was chopping veggies on a mandolin and cut it about 20 min ago. Bleeding controlled in triage. Pt unsure of tetanus status.

## 2024-07-06 NOTE — DISCHARGE INSTRUCTIONS
Clean daily with soap and water, apply antibiotic ointment to the area twice daily.  Apply pressure if the wound starts bleeding again for at least 20 minutes.  Can apply the Surgicel dressing as well which should promote clotting.

## 2025-02-11 ENCOUNTER — HOSPITAL ENCOUNTER (EMERGENCY)
Facility: HOSPITAL | Age: 45
Discharge: HOME | End: 2025-02-11
Attending: STUDENT IN AN ORGANIZED HEALTH CARE EDUCATION/TRAINING PROGRAM
Payer: COMMERCIAL

## 2025-02-11 ENCOUNTER — APPOINTMENT (OUTPATIENT)
Facility: HOSPITAL | Age: 45
End: 2025-02-11
Payer: COMMERCIAL

## 2025-02-11 VITALS
DIASTOLIC BLOOD PRESSURE: 119 MMHG | WEIGHT: 260 LBS | HEART RATE: 79 BPM | RESPIRATION RATE: 18 BRPM | TEMPERATURE: 97.9 F | BODY MASS INDEX: 46.07 KG/M2 | HEIGHT: 63 IN | OXYGEN SATURATION: 99 % | SYSTOLIC BLOOD PRESSURE: 186 MMHG

## 2025-02-11 DIAGNOSIS — S42.401A CLOSED FRACTURE OF RIGHT ELBOW, INITIAL ENCOUNTER: Primary | ICD-10-CM

## 2025-02-11 DIAGNOSIS — S90.02XA CONTUSION OF LEFT ANKLE, INITIAL ENCOUNTER: ICD-10-CM

## 2025-02-11 DIAGNOSIS — S60.211A CONTUSION OF RIGHT WRIST, INITIAL ENCOUNTER: ICD-10-CM

## 2025-02-11 DIAGNOSIS — Z78.9: ICD-10-CM

## 2025-02-11 DIAGNOSIS — S00.91XA ABRASION OF HEAD, INITIAL ENCOUNTER: ICD-10-CM

## 2025-02-11 DIAGNOSIS — S80.01XA CONTUSION OF RIGHT KNEE, INITIAL ENCOUNTER: ICD-10-CM

## 2025-02-11 PROCEDURE — 73562 X-RAY EXAM OF KNEE 3: CPT

## 2025-02-11 PROCEDURE — 73110 X-RAY EXAM OF WRIST: CPT

## 2025-02-11 PROCEDURE — 99283 EMERGENCY DEPT VISIT LOW MDM: CPT

## 2025-02-11 PROCEDURE — 6370000000 HC RX 637 (ALT 250 FOR IP): Performed by: STUDENT IN AN ORGANIZED HEALTH CARE EDUCATION/TRAINING PROGRAM

## 2025-02-11 PROCEDURE — 73564 X-RAY EXAM KNEE 4 OR MORE: CPT

## 2025-02-11 PROCEDURE — 73080 X-RAY EXAM OF ELBOW: CPT

## 2025-02-11 RX ORDER — HYDROCODONE BITARTRATE AND ACETAMINOPHEN 5; 325 MG/1; MG/1
1 TABLET ORAL EVERY 6 HOURS PRN
Qty: 12 TABLET | Refills: 0 | Status: SHIPPED | OUTPATIENT
Start: 2025-02-11 | End: 2025-02-14

## 2025-02-11 RX ORDER — HYDROCODONE BITARTRATE AND ACETAMINOPHEN 5; 325 MG/1; MG/1
1 TABLET ORAL
Status: COMPLETED | OUTPATIENT
Start: 2025-02-11 | End: 2025-02-11

## 2025-02-11 RX ADMIN — HYDROCODONE BITARTRATE AND ACETAMINOPHEN 1 TABLET: 5; 325 TABLET ORAL at 11:08

## 2025-02-11 ASSESSMENT — ENCOUNTER SYMPTOMS
WHEEZING: 0
STRIDOR: 0
ABDOMINAL PAIN: 0
PHOTOPHOBIA: 0
VOMITING: 0
DIARRHEA: 0
CHEST TIGHTNESS: 0
SHORTNESS OF BREATH: 0
NAUSEA: 0
EYE PAIN: 0
EYE REDNESS: 0

## 2025-02-11 ASSESSMENT — PAIN SCALES - GENERAL: PAINLEVEL_OUTOF10: 8

## 2025-02-11 ASSESSMENT — PAIN - FUNCTIONAL ASSESSMENT: PAIN_FUNCTIONAL_ASSESSMENT: 0-10

## 2025-02-11 NOTE — DISCHARGE INSTRUCTIONS
Keep your arm in the sling until you follow up with the orthopedic doctor. Return to the ED for any new or worsening symptoms

## 2025-02-11 NOTE — ED NOTES
Medication given per MAR order. Education regarding medication provided.    Tolerated well with no complaints.     Instructed patient to utilize the call light if he/she needs anything further.        Report given to oncoming RN.     Patient information discussed and reviewed per facility protocol.     Outstanding orders reviewed (if applicable).    No applicable questions at this time.     Will sign off from patient.

## 2025-02-11 NOTE — ED TRIAGE NOTES
Pt ambulated to triage. C/C falling down almost a full flight of stairs from slipping on laundry. Pt reports she was running late for work and the lights weren't on so she didn't see the laundry. Pt reports pain in her right knee, right elbow, and right side of face. Pt denies neck pain. Pt denies LOC or blood thinner use.     Hx of HTN and did not take her blood pressure medication this morning.

## 2025-02-11 NOTE — ED PROVIDER NOTES
Mouth/Throat:      Mouth: Mucous membranes are moist.   Eyes:      Extraocular Movements: Extraocular movements intact.      Pupils: Pupils are equal, round, and reactive to light.   Cardiovascular:      Rate and Rhythm: Normal rate and regular rhythm.   Pulmonary:      Effort: Pulmonary effort is normal.      Breath sounds: Normal breath sounds.   Abdominal:      General: Abdomen is flat.      Palpations: Abdomen is soft.      Tenderness: There is no abdominal tenderness.   Musculoskeletal:         General: Tenderness (Over the radial head of the right elbow, right lateral knee, the ulnar aspect of the right wrist, lateral malleolus of the left ankle) present. No swelling or deformity. Normal range of motion.      Cervical back: Normal range of motion and neck supple.   Skin:     General: Skin is warm and dry.      Capillary Refill: Capillary refill takes less than 2 seconds.      Findings: Bruising (Right elbow, right knee, right wrist, left ankle) present.   Neurological:      General: No focal deficit present.      Mental Status: She is alert and oriented to person, place, and time.      Cranial Nerves: No cranial nerve deficit.      Sensory: No sensory deficit.      Motor: No weakness.      Gait: Gait normal.   Psychiatric:         Mood and Affect: Mood normal.           Diagnostic Study Results     Labs -  No results found for this or any previous visit (from the past 12 hour(s)).    Radiologic Studies -   Non-plain film images such as CT, Ultrasound and MRI are read by the radiologist. Plain radiographic images are visualized and preliminarily interpreted by the emergency physician.    XR WRIST RIGHT (MIN 3 VIEWS)   Final Result   No acute abnormality.      Electronically signed by Isai Thao      XR ELBOW RIGHT (MIN 3 VIEWS)   Final Result   There is suspicion of a small joint effusion which may represent an   occult fracture..      Electronically signed by Isai Thao      XR KNEE RIGHT (MIN 4 VIEWS)

## (undated) DEVICE — SHEARS ENDOSCP L36CM DIA5MM ULTRASONIC CRV TIP W/ ADV

## (undated) DEVICE — BLADE ELECTRODE: Brand: EDGE

## (undated) DEVICE — GARMENT,MEDLINE,DVT,INT,CALF,MED, GEN2: Brand: MEDLINE

## (undated) DEVICE — INTENDED FOR TISSUE SEPARATION, AND OTHER PROCEDURES THAT REQUIRE A SHARP SURGICAL BLADE TO PUNCTURE OR CUT.: Brand: BARD-PARKER ® DISPOSABLE SCALPELS

## (undated) DEVICE — GOWN,AURORA,NONRNF,XL,30/CS: Brand: MEDLINE

## (undated) DEVICE — CATH TY FOL URN MTR CC 16FRX5 -- LUBRI-SIL

## (undated) DEVICE — ARTICULATION RELOAD WITH TRI-STAPLE TECHNOLOGY: Brand: ENDO GIA

## (undated) DEVICE — E-Z CLEAN, PTFE COATED, ELECTROSURGICAL LAPAROSCOPIC ELECTRODE, L-HOOK, 33 CM., SINGLE-USE, FOR USE WITH HAND CONTROL PENCIL: Brand: MEGADYNE

## (undated) DEVICE — TOWEL,OR,DSP,ST,BLUE,STD,4/PK,20PK/CS: Brand: MEDLINE

## (undated) DEVICE — LAP CHOLE: Brand: MEDLINE INDUSTRIES, INC.

## (undated) DEVICE — SUT MONOCRYL PLUS UD 4-0 --

## (undated) DEVICE — SUT VCRL + 2-0 27IN SH UD --

## (undated) DEVICE — REM POLYHESIVE ADULT PATIENT RETURN ELECTRODE: Brand: VALLEYLAB

## (undated) DEVICE — SUTURE PDS + SZ 1 L96IN ABSRB VLT L65MM TP-1 1/2 CIR PDP880G

## (undated) DEVICE — ACCESS PLATFORM FOR MINIMALLY INVASIVE SURGERY.: Brand: GELPORT® LAPAROSCOPIC  SYSTEM

## (undated) DEVICE — STRIP,CLOSURE,WOUND,MEDI-STRIP,1/2X4: Brand: MEDLINE

## (undated) DEVICE — UNIVERSAL STAPLER: Brand: ENDO GIA ULTRA

## (undated) DEVICE — 2, DISPOSABLE SUCTION/IRRIGATOR WITHOUT DISPOSABLE TIP: Brand: STRYKEFLOW

## (undated) DEVICE — CLIP LIG ABSRB HEM-LOK LG PUR --

## (undated) DEVICE — TROCAR LAP L100MM DIA5MM BLDELSS W/ STBL SL ENDOPATH XCEL

## (undated) DEVICE — SYR 20ML LL STRL LF --

## (undated) DEVICE — TROCAR ENDOSCP SHFT L150MM DIA12MM BLDELSS ENDOPATH XCEL

## (undated) DEVICE — SUT VCRL + 0 36IN CT1 UD --

## (undated) DEVICE — DERMABOND SKIN ADH 0.7ML -- DERMABOND ADVANCED 12/BX

## (undated) DEVICE — STERILE POLYISOPRENE POWDER-FREE SURGICAL GLOVES: Brand: PROTEXIS

## (undated) DEVICE — SPONGE LAP 18X18IN STRL -- 5/PK

## (undated) DEVICE — SPONGE HEMSTAT SURGCEL 2X4IN -- SURGIFOAM

## (undated) DEVICE — Device

## (undated) DEVICE — VISUALIZATION SYSTEM: Brand: CLEARIFY

## (undated) DEVICE — STRAP,POSITIONING,KNEE/BODY,FOAM,4X60": Brand: MEDLINE

## (undated) DEVICE — SOLUTION LACTATED RINGERS INJECTION USP

## (undated) DEVICE — SUT VCRL + 2-0 36IN CT1 UD --